# Patient Record
Sex: MALE | Race: WHITE | NOT HISPANIC OR LATINO | Employment: OTHER | URBAN - METROPOLITAN AREA
[De-identification: names, ages, dates, MRNs, and addresses within clinical notes are randomized per-mention and may not be internally consistent; named-entity substitution may affect disease eponyms.]

---

## 2023-10-28 ENCOUNTER — HOSPITAL ENCOUNTER (EMERGENCY)
Facility: MEDICAL CENTER | Age: 83
End: 2023-10-29
Attending: EMERGENCY MEDICINE
Payer: MEDICARE

## 2023-10-28 ENCOUNTER — APPOINTMENT (OUTPATIENT)
Dept: RADIOLOGY | Facility: MEDICAL CENTER | Age: 83
End: 2023-10-28
Attending: EMERGENCY MEDICINE
Payer: MEDICARE

## 2023-10-28 DIAGNOSIS — M25.561 ACUTE PAIN OF RIGHT KNEE: ICD-10-CM
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
## 2023-10-28 DIAGNOSIS — S51.012A SKIN TEAR OF ELBOW WITHOUT COMPLICATION, LEFT, INITIAL ENCOUNTER: ICD-10-CM

## 2023-10-28 DIAGNOSIS — M25.551 RIGHT HIP PAIN: ICD-10-CM

## 2023-10-28 DIAGNOSIS — N30.01 ACUTE CYSTITIS WITH HEMATURIA: ICD-10-CM

## 2023-10-28 PROCEDURE — 99285 EMERGENCY DEPT VISIT HI MDM: CPT

## 2023-10-28 RX ORDER — LIDOCAINE HYDROCHLORIDE 20 MG/ML
JELLY TOPICAL ONCE
Status: COMPLETED | OUTPATIENT
Start: 2023-10-29 | End: 2023-10-29

## 2023-10-28 ASSESSMENT — PAIN DESCRIPTION - PAIN TYPE: TYPE: ACUTE PAIN

## 2023-10-29 ENCOUNTER — HOSPITAL ENCOUNTER (OUTPATIENT)
Dept: RADIOLOGY | Facility: MEDICAL CENTER | Age: 83
End: 2023-10-29
Attending: EMERGENCY MEDICINE
Payer: COMMERCIAL

## 2023-10-29 VITALS
DIASTOLIC BLOOD PRESSURE: 74 MMHG | BODY MASS INDEX: 23.1 KG/M2 | RESPIRATION RATE: 18 BRPM | HEIGHT: 71 IN | SYSTOLIC BLOOD PRESSURE: 129 MMHG | OXYGEN SATURATION: 96 % | TEMPERATURE: 97.8 F | HEART RATE: 103 BPM | WEIGHT: 165 LBS

## 2023-10-29 LAB
ALBUMIN SERPL BCP-MCNC: 3.9 G/DL (ref 3.2–4.9)
ALBUMIN/GLOB SERPL: 1.1 G/DL
ALP SERPL-CCNC: 71 U/L (ref 30–99)
ALT SERPL-CCNC: 21 U/L (ref 2–50)
ANION GAP SERPL CALC-SCNC: 11 MMOL/L (ref 7–16)
APPEARANCE UR: CLEAR
AST SERPL-CCNC: 27 U/L (ref 12–45)
BACTERIA #/AREA URNS HPF: ABNORMAL /HPF
BASOPHILS # BLD AUTO: 0.4 % (ref 0–1.8)
BASOPHILS # BLD: 0.03 K/UL (ref 0–0.12)
BILIRUB SERPL-MCNC: 0.4 MG/DL (ref 0.1–1.5)
BILIRUB UR QL STRIP.AUTO: NEGATIVE
BUN SERPL-MCNC: 21 MG/DL (ref 8–22)
CALCIUM ALBUM COR SERPL-MCNC: 8.9 MG/DL (ref 8.5–10.5)
CALCIUM SERPL-MCNC: 8.8 MG/DL (ref 8.5–10.5)
CHLORIDE SERPL-SCNC: 101 MMOL/L (ref 96–112)
CO2 SERPL-SCNC: 21 MMOL/L (ref 20–33)
COLOR UR: YELLOW
CREAT SERPL-MCNC: 0.77 MG/DL (ref 0.5–1.4)
EOSINOPHIL # BLD AUTO: 0.03 K/UL (ref 0–0.51)
EOSINOPHIL NFR BLD: 0.4 % (ref 0–6.9)
EPI CELLS #/AREA URNS HPF: NEGATIVE /HPF
ERYTHROCYTE [DISTWIDTH] IN BLOOD BY AUTOMATED COUNT: 44.1 FL (ref 35.9–50)
GFR SERPLBLD CREATININE-BSD FMLA CKD-EPI: 87 ML/MIN/1.73 M 2
GLOBULIN SER CALC-MCNC: 3.5 G/DL (ref 1.9–3.5)
GLUCOSE SERPL-MCNC: 111 MG/DL (ref 65–99)
GLUCOSE UR STRIP.AUTO-MCNC: NEGATIVE MG/DL
HCT VFR BLD AUTO: 35.9 % (ref 42–52)
HGB BLD-MCNC: 11.8 G/DL (ref 14–18)
HYALINE CASTS #/AREA URNS LPF: ABNORMAL /LPF
IMM GRANULOCYTES # BLD AUTO: 0.12 K/UL (ref 0–0.11)
IMM GRANULOCYTES NFR BLD AUTO: 1.4 % (ref 0–0.9)
KETONES UR STRIP.AUTO-MCNC: NEGATIVE MG/DL
LEUKOCYTE ESTERASE UR QL STRIP.AUTO: ABNORMAL
LYMPHOCYTES # BLD AUTO: 1.39 K/UL (ref 1–4.8)
LYMPHOCYTES NFR BLD: 16.3 % (ref 22–41)
MCH RBC QN AUTO: 29.1 PG (ref 27–33)
MCHC RBC AUTO-ENTMCNC: 32.9 G/DL (ref 32.3–36.5)
MCV RBC AUTO: 88.6 FL (ref 81.4–97.8)
MICRO URNS: ABNORMAL
MONOCYTES # BLD AUTO: 0.51 K/UL (ref 0–0.85)
MONOCYTES NFR BLD AUTO: 6 % (ref 0–13.4)
NEUTROPHILS # BLD AUTO: 6.45 K/UL (ref 1.82–7.42)
NEUTROPHILS NFR BLD: 75.5 % (ref 44–72)
NITRITE UR QL STRIP.AUTO: POSITIVE
NRBC # BLD AUTO: 0 K/UL
NRBC BLD-RTO: 0 /100 WBC (ref 0–0.2)
PH UR STRIP.AUTO: 6.5 [PH] (ref 5–8)
PLATELET # BLD AUTO: 197 K/UL (ref 164–446)
PMV BLD AUTO: 9.3 FL (ref 9–12.9)
POTASSIUM SERPL-SCNC: 4 MMOL/L (ref 3.6–5.5)
PROT SERPL-MCNC: 7.4 G/DL (ref 6–8.2)
PROT UR QL STRIP: NEGATIVE MG/DL
RBC # BLD AUTO: 4.05 M/UL (ref 4.7–6.1)
RBC # URNS HPF: ABNORMAL /HPF
RBC UR QL AUTO: ABNORMAL
SODIUM SERPL-SCNC: 133 MMOL/L (ref 135–145)
SP GR UR STRIP.AUTO: 1.01
UROBILINOGEN UR STRIP.AUTO-MCNC: 0.2 MG/DL
WBC # BLD AUTO: 8.5 K/UL (ref 4.8–10.8)
WBC #/AREA URNS HPF: ABNORMAL /HPF

## 2023-10-29 PROCEDURE — 73564 X-RAY EXAM KNEE 4 OR MORE: CPT | Mod: RT

## 2023-10-29 PROCEDURE — A9270 NON-COVERED ITEM OR SERVICE: HCPCS | Performed by: EMERGENCY MEDICINE

## 2023-10-29 PROCEDURE — 80053 COMPREHEN METABOLIC PANEL: CPT

## 2023-10-29 PROCEDURE — 72170 X-RAY EXAM OF PELVIS: CPT

## 2023-10-29 PROCEDURE — 700102 HCHG RX REV CODE 250 W/ 637 OVERRIDE(OP): Performed by: EMERGENCY MEDICINE

## 2023-10-29 PROCEDURE — 81001 URINALYSIS AUTO W/SCOPE: CPT

## 2023-10-29 PROCEDURE — 85025 COMPLETE CBC W/AUTO DIFF WBC: CPT

## 2023-10-29 PROCEDURE — 36415 COLL VENOUS BLD VENIPUNCTURE: CPT

## 2023-10-29 PROCEDURE — 304217 HCHG IRRIGATION SYSTEM

## 2023-10-29 PROCEDURE — 73080 X-RAY EXAM OF ELBOW: CPT | Mod: LT

## 2023-10-29 PROCEDURE — 73552 X-RAY EXAM OF FEMUR 2/>: CPT | Mod: RT

## 2023-10-29 PROCEDURE — 700101 HCHG RX REV CODE 250: Performed by: EMERGENCY MEDICINE

## 2023-10-29 RX ORDER — CEPHALEXIN 500 MG/1
500 CAPSULE ORAL 2 TIMES DAILY
Qty: 10 CAPSULE | Refills: 0 | Status: ACTIVE | OUTPATIENT
Start: 2023-10-29 | End: 2023-11-03

## 2023-10-29 RX ORDER — CEPHALEXIN 500 MG/1
500 CAPSULE ORAL ONCE
Status: COMPLETED | OUTPATIENT
Start: 2023-10-29 | End: 2023-10-29

## 2023-10-29 RX ADMIN — CEPHALEXIN 500 MG: 500 CAPSULE ORAL at 02:53

## 2023-10-29 RX ADMIN — LIDOCAINE HYDROCHLORIDE 120 DOSE: 20 JELLY TOPICAL at 00:39

## 2023-10-29 NOTE — ED PROVIDER NOTES
ED Provider Note    CHIEF COMPLAINT  Chief Complaint   Patient presents with    GLF     BIB EMS after reported GLF. Patient a resident at [facility]. Staff found patinet down at 9pm and suspected GLF. Patient reports no trauma/pain to head/neck. Patients only complaint is right knee pain and a skin tear on left forearm     Knee Pain     Patient report right knee pain after GLF.     EXTERNAL RECORDS REVIEWED  none    HPI/ROS  LIMITATION TO HISTORY   Select: dementia  OUTSIDE HISTORIAN(S):  Significant other at bedside who provides majority of history    Alexa Mcclelland is a 83 y.o. male who presents to the emergency room coming from an assisted living center.  At approximately 9 PM the patient had fallen from his wheelchair where he is chronically bound.  He does not walk on his own, his wife lives at their home and received a call saying that he had fallen but this had not been witnessed.  He reports he believes he fell mostly on his right hip and knee and had also extended his left arm out to observe the fall.  He has a small abrasion/laceration on his left elbow that has been dressed and continues to complain of right-sided hip and leg pain.  There is no obvious shortening, he is not on blood thinners, he had no head strike or loss of consciousness per him.  He is otherwise pleasant though has dementia and is somewhat inconsistent historian.    PAST MEDICAL HISTORY   Dementia, hypertension, dyslipidemia    SURGICAL HISTORY  patient denies any surgical history    FAMILY HISTORY  No family history on file.    SOCIAL HISTORY  Social History     Tobacco Use    Smoking status: Not on file    Smokeless tobacco: Not on file   Substance and Sexual Activity    Alcohol use: Not on file    Drug use: Not on file    Sexual activity: Not on file       CURRENT MEDICATIONS  Home Medications    **Home medications have not yet been reviewed for this encounter**         ALLERGIES  Allergies   Allergen Reactions     "Hydrocodone Unspecified     Allergy to Vicodin    Neosporin [Bacitracin-Polymyxin B] Unspecified     Patient unaware of reaction       PHYSICAL EXAM  VITAL SIGNS: BP (!) 153/68   Pulse 81   Ht 1.803 m (5' 11\")   Wt 74.8 kg (165 lb)   SpO2 96%   BMI 23.01 kg/m²    Genl: Elderly demented M sitting in gurney uncomfortably, speaking clearly, appears in very mild discomfort  Head: NC/AT   ENT: Mucous membranes moist, posterior pharynx clear, uvula midline, nares patent bilaterally   Pulmonary: Lungs are clear to auscultation bilaterally  Chest: No TTP  CV:  RRR, no murmur appreciated, pulses 2+ in both upper and lower extremities,  Abdomen: soft, NT/ND; no rebound/guarding, no masses palpated, no HSM   Pelvis: pain with patient over the right iliac crest and ASIS.  : no CVA or suprapubic tenderness   Musculoskeletal: Pain free ROM of the neck. Moving upper extremities in spontaneous and coordinated fashion.  Right hip is limited secondary to pain.  Same with me.  Tenderness along the long bones of the right femur.  No obvious angulations.  Neuro: A&Ox2 (person, place), speech fluent, gait not assessed as he is wheelchair bound. no focal deficits appreciated, No cerebellar signs. Sensation is grossly intact in the distal upper and lower extremities.  5/5 strength in   Psych: Patient has an appropriate affect and behavior  Skin: No rash or lesions.  No pallor or jaundice.  No cyanosis.  Warm and dry.     DIAGNOSTIC STUDIES / PROCEDURES    LABS  Labs Reviewed   CBC WITH DIFFERENTIAL - Abnormal; Notable for the following components:       Result Value    RBC 4.05 (*)     Hemoglobin 11.8 (*)     Hematocrit 35.9 (*)     Neutrophils-Polys 75.50 (*)     Lymphocytes 16.30 (*)     Immature Granulocytes 1.40 (*)     Immature Granulocytes (abs) 0.12 (*)     All other components within normal limits   COMP METABOLIC PANEL - Abnormal; Notable for the following components:    Sodium 133 (*)     Glucose 111 (*)     All other " components within normal limits   URINALYSIS - Abnormal; Notable for the following components:    Nitrite Positive (*)     Leukocyte Esterase Small (*)     Occult Blood Trace (*)     All other components within normal limits    Narrative:     Release to patient->Immediate   URINE MICROSCOPIC (W/UA) - Abnormal; Notable for the following components:    WBC 10-20 (*)     RBC 2-5 (*)     Bacteria Many (*)     All other components within normal limits    Narrative:     Release to patient->Immediate   ESTIMATED GFR     RADIOLOGY  I have independently interpreted the diagnostic imaging associated with this visit and am waiting the final reading from the radiologist.   My preliminary interpretation is as follows: No evidence of acute fracture of the elbow, pelvis/hip, femur or knee.  Radiologist interpretation:   DX-KNEE COMPLETE 4+ RIGHT   Final Result      No fracture or dislocation of RIGHT knee.      DX-FEMUR-2+ RIGHT   Final Result      No RIGHT femur fracture.      DX-PELVIS-1 OR 2 VIEWS   Final Result      1.  No pelvic fracture.   2.  Peripherally sclerotic lesion in the LEFT proximal femur most likely enchondroma although exact etiology is uncertain.      DX-ELBOW-COMPLETE 3+ LEFT   Final Result      No fracture or dislocation of LEFT elbow.        COURSE & MEDICAL DECISION MAKING    ED Observation Status? No; Patient does not meet criteria for ED Observation.     INITIAL ASSESSMENT, COURSE AND PLAN  Hip contusion, fracture, long bone injury, skin tear    Care Narrative: Presents emergency room for symptoms as described above.  The patient had a fall from his wheelchair for which he is chronically dependent on.  He is alert, pleasantly demented and oriented to self and location and his family members.  He does not have any head or neck discomfort, no recent prodromal symptoms and has a skin tear on the left elbow with some right hip and leg discomfort.  I was concerned about possible traumatic injuries or fractures  in addition to the possibility of slowly developing a urinary tract infection.  He does not have septic vital signs, he does not have hemodynamic instability.    Lab work showed leukocytosis, stable chronic anemia, no gross metabolic derangements.  No LFT elevations, no PETER.  Urinalysis shows bacteria with 10-20 white cells and presence of nitrates and excite esterase.  He does not have flank tenderness to suggest pyelonephritis and I do not see substantial amount of hematuria to suggest concurrent stone pathology.  He is treated with antibiotics, updated regarding his basic wound care of his skin tear of the left upper extremity and he discharged home in stable condition.    Strict return precautions are discussed.    DISPOSITION AND DISCUSSIONS  I have discussed management of the patient with the following physicians and IAIN's:  none    Discussion of management with other [facility] or appropriate source(s): None     Escalation of care considered, and ultimately not performed:acute inpatient care management, however at this time, the patient is most appropriate for outpatient management    Decision tools and prescription drugs considered including, but not limited to: Antibiotics Keflex for UTI .    FINAL DIAGNOSIS  1. Acute pain of right knee    2. Fall, initial encounter    3. Right hip pain    4. Acute cystitis with hematuria    5. Skin tear of elbow without complication, left, initial encounter      Electronically signed by: Syd Griffin M.D., 10/28/2023 11:43 PM

## 2023-10-29 NOTE — ED NOTES
Pt discharged, all appropriate hospital equipment removed (IV, monitor, pulse ox, etc.). Pt left unit via gurney with EMS to [place] [facility]. Personal belongings with pt when leaving unit. Pt discharge instructions given to wife prior to leaving unit including where to  prescriptions and when to follow-up; she verbalizes understanding. Pt and family informed to return to ED if symptoms worsen/return or altered status develop. Copy of discharge instructions signed and turned into DC basket and copy sent with pt.

## 2023-10-29 NOTE — DISCHARGE PLANNING
Medical Social Work     FREDERICK received a call from the RN requesting FREDERICK assistance with [facility] transport back to [facility]. FREDERICK called and spoke with staff at the facility they will be waiting for the pt. FREDERICK faxed a PCS form to [facility] and set up transport with  for 0400. RN aware of transport time.

## 2023-10-29 NOTE — ED NOTES
Rounded on patient. He remains resting comfortably in bed. He has access to urinal and call light. No immediate needs at this time

## 2023-10-29 NOTE — ED NOTES
Patient cleaned after bowl incontinence noticed. Noticed very red skin around groin area. Patient was cleaned with no rinse foam  and warm towels.

## 2023-10-29 NOTE — ED TRIAGE NOTES
Chief Complaint   Patient presents with    GLF     BIB EMS after reported GLF. Patient a resident at [facility]. Staff found patinet down at 9pm and suspected GLF. Patient reports no trauma/pain to head/neck. Patients only complaint is right knee pain and a skin tear on left forearm     Knee Pain     Patient report right knee pain after GLF.     Patient gowned and placed on monitors. Family at bedside. Patient does not recall the fall. Expresses pain in right knee. Wound to left forearm. Patient has history of dementia and HTN. Patient usually uses wheelchair for ambulation. Patient stable on RA

## 2023-10-29 NOTE — ED NOTES
Mariajose at [facility] called and given report. Informed her that patient would be DC soon and would be arriving back to facility via [facility]

## 2023-12-22 ENCOUNTER — HOSPITAL ENCOUNTER (INPATIENT)
Facility: MEDICAL CENTER | Age: 83
LOS: 4 days | DRG: 378 | End: 2023-12-26
Attending: STUDENT IN AN ORGANIZED HEALTH CARE EDUCATION/TRAINING PROGRAM | Admitting: HOSPITALIST
Payer: COMMERCIAL

## 2023-12-22 DIAGNOSIS — K92.1 GASTROINTESTINAL HEMORRHAGE WITH MELENA: ICD-10-CM

## 2023-12-22 DIAGNOSIS — K92.1 MELENA: ICD-10-CM

## 2023-12-22 DIAGNOSIS — Z79.01 ON ANTICOAGULANT THERAPY: ICD-10-CM

## 2023-12-22 DIAGNOSIS — F03.90 DEMENTIA, UNSPECIFIED DEMENTIA SEVERITY, UNSPECIFIED DEMENTIA TYPE, UNSPECIFIED WHETHER BEHAVIORAL, PSYCHOTIC, OR MOOD DISTURBANCE OR ANXIETY (HCC): ICD-10-CM

## 2023-12-22 DIAGNOSIS — D64.9 ANEMIA, UNSPECIFIED TYPE: ICD-10-CM

## 2023-12-22 PROBLEM — D62 ACUTE BLOOD LOSS ANEMIA: Status: ACTIVE | Noted: 2023-12-22

## 2023-12-22 PROBLEM — I48.91 AF (ATRIAL FIBRILLATION) (HCC): Status: ACTIVE | Noted: 2023-12-22

## 2023-12-22 PROBLEM — K92.2 GI BLEED: Status: ACTIVE | Noted: 2023-12-22

## 2023-12-22 PROBLEM — I25.10 CORONARY ARTERY DISEASE: Status: ACTIVE | Noted: 2023-12-22

## 2023-12-22 PROBLEM — I10 HYPERTENSION: Status: ACTIVE | Noted: 2023-12-22

## 2023-12-22 LAB
ABO + RH BLD: NORMAL
ABO GROUP BLD: NORMAL
ALBUMIN SERPL BCP-MCNC: 3.9 G/DL (ref 3.2–4.9)
ALBUMIN/GLOB SERPL: 1.4 G/DL
ALP SERPL-CCNC: 77 U/L (ref 30–99)
ALT SERPL-CCNC: 23 U/L (ref 2–50)
ANION GAP SERPL CALC-SCNC: 10 MMOL/L (ref 7–16)
APTT PPP: 35.5 SEC (ref 24.7–36)
AST SERPL-CCNC: 29 U/L (ref 12–45)
BASOPHILS # BLD AUTO: 0.4 % (ref 0–1.8)
BASOPHILS # BLD: 0.03 K/UL (ref 0–0.12)
BILIRUB SERPL-MCNC: 0.3 MG/DL (ref 0.1–1.5)
BLD GP AB SCN SERPL QL: NORMAL
BUN SERPL-MCNC: 20 MG/DL (ref 8–22)
CALCIUM ALBUM COR SERPL-MCNC: 8.7 MG/DL (ref 8.5–10.5)
CALCIUM SERPL-MCNC: 8.6 MG/DL (ref 8.5–10.5)
CHLORIDE SERPL-SCNC: 103 MMOL/L (ref 96–112)
CO2 SERPL-SCNC: 23 MMOL/L (ref 20–33)
CREAT SERPL-MCNC: 0.8 MG/DL (ref 0.5–1.4)
EOSINOPHIL # BLD AUTO: 0.05 K/UL (ref 0–0.51)
EOSINOPHIL NFR BLD: 0.7 % (ref 0–6.9)
ERYTHROCYTE [DISTWIDTH] IN BLOOD BY AUTOMATED COUNT: 44.1 FL (ref 35.9–50)
GFR SERPLBLD CREATININE-BSD FMLA CKD-EPI: 86 ML/MIN/1.73 M 2
GLOBULIN SER CALC-MCNC: 2.8 G/DL (ref 1.9–3.5)
GLUCOSE SERPL-MCNC: 97 MG/DL (ref 65–99)
HCT VFR BLD AUTO: 25.5 % (ref 42–52)
HGB BLD-MCNC: 8.4 G/DL (ref 14–18)
IMM GRANULOCYTES # BLD AUTO: 0.06 K/UL (ref 0–0.11)
IMM GRANULOCYTES NFR BLD AUTO: 0.8 % (ref 0–0.9)
INR PPP: 1.17 (ref 0.87–1.13)
LIPASE SERPL-CCNC: 66 U/L (ref 11–82)
LYMPHOCYTES # BLD AUTO: 2.06 K/UL (ref 1–4.8)
LYMPHOCYTES NFR BLD: 28 % (ref 22–41)
MCH RBC QN AUTO: 28.6 PG (ref 27–33)
MCHC RBC AUTO-ENTMCNC: 32.9 G/DL (ref 32.3–36.5)
MCV RBC AUTO: 86.7 FL (ref 81.4–97.8)
MONOCYTES # BLD AUTO: 0.5 K/UL (ref 0–0.85)
MONOCYTES NFR BLD AUTO: 6.8 % (ref 0–13.4)
NEUTROPHILS # BLD AUTO: 4.67 K/UL (ref 1.82–7.42)
NEUTROPHILS NFR BLD: 63.3 % (ref 44–72)
NRBC # BLD AUTO: 0 K/UL
NRBC BLD-RTO: 0 /100 WBC (ref 0–0.2)
PLATELET # BLD AUTO: 218 K/UL (ref 164–446)
PMV BLD AUTO: 9.4 FL (ref 9–12.9)
POTASSIUM SERPL-SCNC: 4.2 MMOL/L (ref 3.6–5.5)
PROT SERPL-MCNC: 6.7 G/DL (ref 6–8.2)
PROTHROMBIN TIME: 15 SEC (ref 12–14.6)
RBC # BLD AUTO: 2.94 M/UL (ref 4.7–6.1)
RH BLD: NORMAL
SODIUM SERPL-SCNC: 136 MMOL/L (ref 135–145)
WBC # BLD AUTO: 7.4 K/UL (ref 4.8–10.8)

## 2023-12-22 PROCEDURE — 99285 EMERGENCY DEPT VISIT HI MDM: CPT

## 2023-12-22 PROCEDURE — 86901 BLOOD TYPING SEROLOGIC RH(D): CPT

## 2023-12-22 PROCEDURE — 85730 THROMBOPLASTIN TIME PARTIAL: CPT

## 2023-12-22 PROCEDURE — 80053 COMPREHEN METABOLIC PANEL: CPT

## 2023-12-22 PROCEDURE — 86900 BLOOD TYPING SEROLOGIC ABO: CPT

## 2023-12-22 PROCEDURE — 700102 HCHG RX REV CODE 250 W/ 637 OVERRIDE(OP): Performed by: HOSPITALIST

## 2023-12-22 PROCEDURE — 700111 HCHG RX REV CODE 636 W/ 250 OVERRIDE (IP): Performed by: HOSPITALIST

## 2023-12-22 PROCEDURE — 85025 COMPLETE CBC W/AUTO DIFF WBC: CPT

## 2023-12-22 PROCEDURE — 83690 ASSAY OF LIPASE: CPT

## 2023-12-22 PROCEDURE — 700105 HCHG RX REV CODE 258: Performed by: STUDENT IN AN ORGANIZED HEALTH CARE EDUCATION/TRAINING PROGRAM

## 2023-12-22 PROCEDURE — 700111 HCHG RX REV CODE 636 W/ 250 OVERRIDE (IP): Performed by: STUDENT IN AN ORGANIZED HEALTH CARE EDUCATION/TRAINING PROGRAM

## 2023-12-22 PROCEDURE — 86850 RBC ANTIBODY SCREEN: CPT

## 2023-12-22 PROCEDURE — 85610 PROTHROMBIN TIME: CPT

## 2023-12-22 PROCEDURE — 96374 THER/PROPH/DIAG INJ IV PUSH: CPT

## 2023-12-22 PROCEDURE — 99222 1ST HOSP IP/OBS MODERATE 55: CPT | Performed by: INTERNAL MEDICINE

## 2023-12-22 PROCEDURE — A9270 NON-COVERED ITEM OR SERVICE: HCPCS | Performed by: HOSPITALIST

## 2023-12-22 PROCEDURE — 36415 COLL VENOUS BLD VENIPUNCTURE: CPT

## 2023-12-22 PROCEDURE — C9113 INJ PANTOPRAZOLE SODIUM, VIA: HCPCS | Performed by: HOSPITALIST

## 2023-12-22 PROCEDURE — 99223 1ST HOSP IP/OBS HIGH 75: CPT | Mod: AI | Performed by: HOSPITALIST

## 2023-12-22 PROCEDURE — C9113 INJ PANTOPRAZOLE SODIUM, VIA: HCPCS | Performed by: STUDENT IN AN ORGANIZED HEALTH CARE EDUCATION/TRAINING PROGRAM

## 2023-12-22 PROCEDURE — 770006 HCHG ROOM/CARE - MED/SURG/GYN SEMI*

## 2023-12-22 RX ORDER — ONDANSETRON 2 MG/ML
4 INJECTION INTRAMUSCULAR; INTRAVENOUS EVERY 4 HOURS PRN
Status: DISCONTINUED | OUTPATIENT
Start: 2023-12-22 | End: 2023-12-26 | Stop reason: HOSPADM

## 2023-12-22 RX ORDER — TERAZOSIN 1 MG/1
4 CAPSULE ORAL NIGHTLY
Status: DISCONTINUED | OUTPATIENT
Start: 2023-12-22 | End: 2023-12-26 | Stop reason: HOSPADM

## 2023-12-22 RX ORDER — METOPROLOL SUCCINATE 25 MG/1
25 TABLET, EXTENDED RELEASE ORAL DAILY
Status: DISCONTINUED | OUTPATIENT
Start: 2023-12-23 | End: 2023-12-26 | Stop reason: HOSPADM

## 2023-12-22 RX ORDER — AMLODIPINE BESYLATE 5 MG/1
5 TABLET ORAL 2 TIMES DAILY
COMMUNITY

## 2023-12-22 RX ORDER — TERAZOSIN 2 MG/1
4 CAPSULE ORAL NIGHTLY
COMMUNITY

## 2023-12-22 RX ORDER — QUETIAPINE FUMARATE 25 MG/1
25 TABLET, FILM COATED ORAL 2 TIMES DAILY
Status: DISCONTINUED | OUTPATIENT
Start: 2023-12-22 | End: 2023-12-26 | Stop reason: HOSPADM

## 2023-12-22 RX ORDER — GALANTAMINE HYDROBROMIDE 16 MG/1
16 CAPSULE, EXTENDED RELEASE ORAL
COMMUNITY

## 2023-12-22 RX ORDER — HYDROXYZINE HYDROCHLORIDE 10 MG/1
10 TABLET, FILM COATED ORAL EVERY 6 HOURS
COMMUNITY

## 2023-12-22 RX ORDER — ONDANSETRON 4 MG/1
4 TABLET, ORALLY DISINTEGRATING ORAL EVERY 4 HOURS PRN
Status: DISCONTINUED | OUTPATIENT
Start: 2023-12-22 | End: 2023-12-26 | Stop reason: HOSPADM

## 2023-12-22 RX ORDER — CLOPIDOGREL BISULFATE 75 MG/1
75 TABLET ORAL DAILY
Status: ON HOLD | COMMUNITY
End: 2023-12-26

## 2023-12-22 RX ORDER — PANTOPRAZOLE SODIUM 40 MG/10ML
40 INJECTION, POWDER, LYOPHILIZED, FOR SOLUTION INTRAVENOUS 2 TIMES DAILY
Status: DISCONTINUED | OUTPATIENT
Start: 2023-12-22 | End: 2023-12-26 | Stop reason: HOSPADM

## 2023-12-22 RX ORDER — ACETAMINOPHEN 325 MG/1
650 TABLET ORAL EVERY 6 HOURS PRN
Status: DISCONTINUED | OUTPATIENT
Start: 2023-12-22 | End: 2023-12-26 | Stop reason: HOSPADM

## 2023-12-22 RX ORDER — LEVETIRACETAM 500 MG/1
1000 TABLET ORAL 2 TIMES DAILY
Status: DISCONTINUED | OUTPATIENT
Start: 2023-12-22 | End: 2023-12-26 | Stop reason: HOSPADM

## 2023-12-22 RX ORDER — MYCOPHENOLATE MOFETIL 500 MG/1
500 TABLET ORAL 2 TIMES DAILY
COMMUNITY

## 2023-12-22 RX ORDER — QUETIAPINE FUMARATE 25 MG/1
25 TABLET, FILM COATED ORAL
COMMUNITY

## 2023-12-22 RX ORDER — DEXTRAN 70, GLYCERIN, HYPROMELLOSE 1; 2; 3 MG/ML; MG/ML; MG/ML
1 SOLUTION/ DROPS OPHTHALMIC 4 TIMES DAILY PRN
COMMUNITY

## 2023-12-22 RX ORDER — ROSUVASTATIN CALCIUM 40 MG/1
40 TABLET, COATED ORAL DAILY
COMMUNITY

## 2023-12-22 RX ORDER — METOPROLOL SUCCINATE 25 MG/1
25 TABLET, EXTENDED RELEASE ORAL DAILY
COMMUNITY

## 2023-12-22 RX ORDER — QUETIAPINE FUMARATE 25 MG/1
25 TABLET, FILM COATED ORAL 2 TIMES DAILY
COMMUNITY

## 2023-12-22 RX ORDER — SERTRALINE HYDROCHLORIDE 100 MG/1
100 TABLET, FILM COATED ORAL DAILY
COMMUNITY

## 2023-12-22 RX ORDER — AMLODIPINE BESYLATE 5 MG/1
5 TABLET ORAL 2 TIMES DAILY
Status: DISCONTINUED | OUTPATIENT
Start: 2023-12-22 | End: 2023-12-26 | Stop reason: HOSPADM

## 2023-12-22 RX ORDER — SERTRALINE HYDROCHLORIDE 100 MG/1
100 TABLET, FILM COATED ORAL DAILY
Status: DISCONTINUED | OUTPATIENT
Start: 2023-12-23 | End: 2023-12-26 | Stop reason: HOSPADM

## 2023-12-22 RX ORDER — LEVETIRACETAM 1000 MG/1
1000 TABLET ORAL 2 TIMES DAILY
COMMUNITY

## 2023-12-22 RX ADMIN — AMLODIPINE BESYLATE 5 MG: 5 TABLET ORAL at 22:28

## 2023-12-22 RX ADMIN — TERAZOSIN HYDROCHLORIDE 4 MG: 1 CAPSULE ORAL at 22:28

## 2023-12-22 RX ADMIN — PANTOPRAZOLE SODIUM 40 MG: 40 INJECTION, POWDER, FOR SOLUTION INTRAVENOUS at 22:30

## 2023-12-22 RX ADMIN — LEVETIRACETAM 1000 MG: 500 TABLET, FILM COATED ORAL at 20:15

## 2023-12-22 RX ADMIN — PANTOPRAZOLE SODIUM 80 MG: 40 INJECTION, POWDER, FOR SOLUTION INTRAVENOUS at 18:02

## 2023-12-22 RX ADMIN — QUETIAPINE FUMARATE 25 MG: 25 TABLET ORAL at 22:28

## 2023-12-22 ASSESSMENT — ENCOUNTER SYMPTOMS
HEARTBURN: 0
WEAKNESS: 0
TINGLING: 0
SORE THROAT: 0
BLOOD IN STOOL: 1
DEPRESSION: 0
HALLUCINATIONS: 0
NAUSEA: 0
PALPITATIONS: 0
SHORTNESS OF BREATH: 0
HEADACHES: 0
DIZZINESS: 0
DIARRHEA: 0
POLYDIPSIA: 0
STRIDOR: 0
TREMORS: 0
BLURRED VISION: 0
CLAUDICATION: 0
ABDOMINAL PAIN: 0
WHEEZING: 0
MYALGIAS: 0
COUGH: 0
ORTHOPNEA: 0
SPUTUM PRODUCTION: 0
FLANK PAIN: 0
FALLS: 0
SINUS PAIN: 0
BACK PAIN: 0
CONSTIPATION: 0
PND: 0
NECK PAIN: 0
BRUISES/BLEEDS EASILY: 0
FEVER: 0
PHOTOPHOBIA: 0
CHILLS: 0
DIAPHORESIS: 0
HEMOPTYSIS: 0
EYE PAIN: 0
VOMITING: 0
DOUBLE VISION: 0

## 2023-12-22 ASSESSMENT — LIFESTYLE VARIABLES
SUBSTANCE_ABUSE: 0
DO YOU DRINK ALCOHOL: NO

## 2023-12-22 ASSESSMENT — PAIN DESCRIPTION - PAIN TYPE: TYPE: ACUTE PAIN

## 2023-12-22 ASSESSMENT — FIBROSIS 4 INDEX: FIB4 SCORE: 2.54

## 2023-12-23 ENCOUNTER — ANESTHESIA (OUTPATIENT)
Dept: SURGERY | Facility: MEDICAL CENTER | Age: 83
DRG: 378 | End: 2023-12-23
Payer: COMMERCIAL

## 2023-12-23 ENCOUNTER — ANESTHESIA EVENT (OUTPATIENT)
Dept: SURGERY | Facility: MEDICAL CENTER | Age: 83
DRG: 378 | End: 2023-12-23
Payer: COMMERCIAL

## 2023-12-23 PROBLEM — Z71.89 ADVANCE CARE PLANNING: Status: ACTIVE | Noted: 2023-12-23

## 2023-12-23 LAB
ALBUMIN SERPL BCP-MCNC: 3.4 G/DL (ref 3.2–4.9)
ALBUMIN/GLOB SERPL: 1.3 G/DL
ALP SERPL-CCNC: 70 U/L (ref 30–99)
ALT SERPL-CCNC: 19 U/L (ref 2–50)
ANION GAP SERPL CALC-SCNC: 10 MMOL/L (ref 7–16)
AST SERPL-CCNC: 27 U/L (ref 12–45)
BASOPHILS # BLD AUTO: 0.3 % (ref 0–1.8)
BASOPHILS # BLD: 0.02 K/UL (ref 0–0.12)
BILIRUB SERPL-MCNC: 0.4 MG/DL (ref 0.1–1.5)
BUN SERPL-MCNC: 18 MG/DL (ref 8–22)
CALCIUM ALBUM COR SERPL-MCNC: 8.8 MG/DL (ref 8.5–10.5)
CALCIUM SERPL-MCNC: 8.3 MG/DL (ref 8.5–10.5)
CHLORIDE SERPL-SCNC: 104 MMOL/L (ref 96–112)
CO2 SERPL-SCNC: 22 MMOL/L (ref 20–33)
CREAT SERPL-MCNC: 0.83 MG/DL (ref 0.5–1.4)
EKG IMPRESSION: NORMAL
EOSINOPHIL # BLD AUTO: 0.06 K/UL (ref 0–0.51)
EOSINOPHIL NFR BLD: 0.9 % (ref 0–6.9)
ERYTHROCYTE [DISTWIDTH] IN BLOOD BY AUTOMATED COUNT: 45.8 FL (ref 35.9–50)
FERRITIN SERPL-MCNC: 41.1 NG/ML (ref 22–322)
GFR SERPLBLD CREATININE-BSD FMLA CKD-EPI: 85 ML/MIN/1.73 M 2
GLOBULIN SER CALC-MCNC: 2.6 G/DL (ref 1.9–3.5)
GLUCOSE SERPL-MCNC: 103 MG/DL (ref 65–99)
HCT VFR BLD AUTO: 23.5 % (ref 42–52)
HGB BLD-MCNC: 7.2 G/DL (ref 14–18)
HGB BLD-MCNC: 7.6 G/DL (ref 14–18)
HGB BLD-MCNC: 7.6 G/DL (ref 14–18)
IMM GRANULOCYTES # BLD AUTO: 0.06 K/UL (ref 0–0.11)
IMM GRANULOCYTES NFR BLD AUTO: 0.9 % (ref 0–0.9)
IRON SATN MFR SERPL: 12 % (ref 15–55)
IRON SERPL-MCNC: 36 UG/DL (ref 50–180)
LYMPHOCYTES # BLD AUTO: 1.77 K/UL (ref 1–4.8)
LYMPHOCYTES NFR BLD: 25.2 % (ref 22–41)
MCH RBC QN AUTO: 28.6 PG (ref 27–33)
MCHC RBC AUTO-ENTMCNC: 32.3 G/DL (ref 32.3–36.5)
MCV RBC AUTO: 88.3 FL (ref 81.4–97.8)
MONOCYTES # BLD AUTO: 0.43 K/UL (ref 0–0.85)
MONOCYTES NFR BLD AUTO: 6.1 % (ref 0–13.4)
NEUTROPHILS # BLD AUTO: 4.67 K/UL (ref 1.82–7.42)
NEUTROPHILS NFR BLD: 66.6 % (ref 44–72)
NRBC # BLD AUTO: 0 K/UL
NRBC BLD-RTO: 0 /100 WBC (ref 0–0.2)
PLATELET # BLD AUTO: 192 K/UL (ref 164–446)
PMV BLD AUTO: 9.5 FL (ref 9–12.9)
POTASSIUM SERPL-SCNC: 3.8 MMOL/L (ref 3.6–5.5)
PROT SERPL-MCNC: 6 G/DL (ref 6–8.2)
RBC # BLD AUTO: 2.66 M/UL (ref 4.7–6.1)
SODIUM SERPL-SCNC: 136 MMOL/L (ref 135–145)
TIBC SERPL-MCNC: 292 UG/DL (ref 250–450)
UIBC SERPL-MCNC: 256 UG/DL (ref 110–370)
WBC # BLD AUTO: 7 K/UL (ref 4.8–10.8)

## 2023-12-23 PROCEDURE — 82728 ASSAY OF FERRITIN: CPT

## 2023-12-23 PROCEDURE — 160202 HCHG ENDO MINUTES - 1ST 30 MINS LEVEL 3: Performed by: INTERNAL MEDICINE

## 2023-12-23 PROCEDURE — 700101 HCHG RX REV CODE 250: Performed by: ANESTHESIOLOGY

## 2023-12-23 PROCEDURE — 80053 COMPREHEN METABOLIC PANEL: CPT

## 2023-12-23 PROCEDURE — 85018 HEMOGLOBIN: CPT | Mod: 91

## 2023-12-23 PROCEDURE — 99497 ADVNCD CARE PLAN 30 MIN: CPT | Performed by: STUDENT IN AN ORGANIZED HEALTH CARE EDUCATION/TRAINING PROGRAM

## 2023-12-23 PROCEDURE — 85025 COMPLETE CBC W/AUTO DIFF WBC: CPT

## 2023-12-23 PROCEDURE — 83540 ASSAY OF IRON: CPT

## 2023-12-23 PROCEDURE — 93010 ELECTROCARDIOGRAM REPORT: CPT | Performed by: INTERNAL MEDICINE

## 2023-12-23 PROCEDURE — 700101 HCHG RX REV CODE 250: Performed by: INTERNAL MEDICINE

## 2023-12-23 PROCEDURE — 700111 HCHG RX REV CODE 636 W/ 250 OVERRIDE (IP): Performed by: ANESTHESIOLOGY

## 2023-12-23 PROCEDURE — 99233 SBSQ HOSP IP/OBS HIGH 50: CPT | Performed by: STUDENT IN AN ORGANIZED HEALTH CARE EDUCATION/TRAINING PROGRAM

## 2023-12-23 PROCEDURE — 700102 HCHG RX REV CODE 250 W/ 637 OVERRIDE(OP): Performed by: HOSPITALIST

## 2023-12-23 PROCEDURE — 700111 HCHG RX REV CODE 636 W/ 250 OVERRIDE (IP): Mod: JZ | Performed by: HOSPITALIST

## 2023-12-23 PROCEDURE — A9270 NON-COVERED ITEM OR SERVICE: HCPCS | Performed by: HOSPITALIST

## 2023-12-23 PROCEDURE — 43235 EGD DIAGNOSTIC BRUSH WASH: CPT | Performed by: INTERNAL MEDICINE

## 2023-12-23 PROCEDURE — 160048 HCHG OR STATISTICAL LEVEL 1-5: Performed by: INTERNAL MEDICINE

## 2023-12-23 PROCEDURE — 93005 ELECTROCARDIOGRAM TRACING: CPT | Performed by: INTERNAL MEDICINE

## 2023-12-23 PROCEDURE — 36415 COLL VENOUS BLD VENIPUNCTURE: CPT

## 2023-12-23 PROCEDURE — 770006 HCHG ROOM/CARE - MED/SURG/GYN SEMI*

## 2023-12-23 PROCEDURE — 160009 HCHG ANES TIME/MIN: Performed by: INTERNAL MEDICINE

## 2023-12-23 PROCEDURE — 83550 IRON BINDING TEST: CPT

## 2023-12-23 PROCEDURE — 0DJ08ZZ INSPECTION OF UPPER INTESTINAL TRACT, VIA NATURAL OR ARTIFICIAL OPENING ENDOSCOPIC: ICD-10-PCS | Performed by: INTERNAL MEDICINE

## 2023-12-23 PROCEDURE — C9113 INJ PANTOPRAZOLE SODIUM, VIA: HCPCS | Performed by: HOSPITALIST

## 2023-12-23 PROCEDURE — 700105 HCHG RX REV CODE 258: Performed by: ANESTHESIOLOGY

## 2023-12-23 PROCEDURE — 160002 HCHG RECOVERY MINUTES (STAT): Performed by: INTERNAL MEDICINE

## 2023-12-23 PROCEDURE — 160035 HCHG PACU - 1ST 60 MINS PHASE I: Performed by: INTERNAL MEDICINE

## 2023-12-23 RX ORDER — HALOPERIDOL 5 MG/ML
1 INJECTION INTRAMUSCULAR
Status: DISCONTINUED | OUTPATIENT
Start: 2023-12-23 | End: 2023-12-23 | Stop reason: HOSPADM

## 2023-12-23 RX ORDER — DIPHENHYDRAMINE HYDROCHLORIDE 50 MG/ML
12.5 INJECTION INTRAMUSCULAR; INTRAVENOUS
Status: DISCONTINUED | OUTPATIENT
Start: 2023-12-23 | End: 2023-12-23 | Stop reason: HOSPADM

## 2023-12-23 RX ORDER — SODIUM CHLORIDE, SODIUM LACTATE, POTASSIUM CHLORIDE, CALCIUM CHLORIDE 600; 310; 30; 20 MG/100ML; MG/100ML; MG/100ML; MG/100ML
INJECTION, SOLUTION INTRAVENOUS
Status: DISCONTINUED | OUTPATIENT
Start: 2023-12-23 | End: 2023-12-23 | Stop reason: SURG

## 2023-12-23 RX ORDER — SODIUM CHLORIDE, SODIUM LACTATE, POTASSIUM CHLORIDE, CALCIUM CHLORIDE 600; 310; 30; 20 MG/100ML; MG/100ML; MG/100ML; MG/100ML
INJECTION, SOLUTION INTRAVENOUS CONTINUOUS
Status: DISCONTINUED | OUTPATIENT
Start: 2023-12-23 | End: 2023-12-23 | Stop reason: HOSPADM

## 2023-12-23 RX ORDER — ONDANSETRON 2 MG/ML
4 INJECTION INTRAMUSCULAR; INTRAVENOUS
Status: DISCONTINUED | OUTPATIENT
Start: 2023-12-23 | End: 2023-12-23 | Stop reason: HOSPADM

## 2023-12-23 RX ORDER — LIDOCAINE HYDROCHLORIDE 20 MG/ML
INJECTION, SOLUTION EPIDURAL; INFILTRATION; INTRACAUDAL; PERINEURAL PRN
Status: DISCONTINUED | OUTPATIENT
Start: 2023-12-23 | End: 2023-12-23 | Stop reason: SURG

## 2023-12-23 RX ORDER — MIDAZOLAM HYDROCHLORIDE 1 MG/ML
1 INJECTION INTRAMUSCULAR; INTRAVENOUS
Status: DISCONTINUED | OUTPATIENT
Start: 2023-12-23 | End: 2023-12-23 | Stop reason: HOSPADM

## 2023-12-23 RX ORDER — MEPERIDINE HYDROCHLORIDE 25 MG/ML
12.5 INJECTION INTRAMUSCULAR; INTRAVENOUS; SUBCUTANEOUS
Status: DISCONTINUED | OUTPATIENT
Start: 2023-12-23 | End: 2023-12-23 | Stop reason: HOSPADM

## 2023-12-23 RX ADMIN — PANTOPRAZOLE SODIUM 40 MG: 40 INJECTION, POWDER, FOR SOLUTION INTRAVENOUS at 17:42

## 2023-12-23 RX ADMIN — LEVETIRACETAM 1000 MG: 500 TABLET, FILM COATED ORAL at 17:41

## 2023-12-23 RX ADMIN — PROPOFOL 70 MG: 10 INJECTION, EMULSION INTRAVENOUS at 11:22

## 2023-12-23 RX ADMIN — QUETIAPINE FUMARATE 25 MG: 25 TABLET ORAL at 17:42

## 2023-12-23 RX ADMIN — AMLODIPINE BESYLATE 5 MG: 5 TABLET ORAL at 06:30

## 2023-12-23 RX ADMIN — SERTRALINE 100 MG: 100 TABLET, FILM COATED ORAL at 06:30

## 2023-12-23 RX ADMIN — PANTOPRAZOLE SODIUM 40 MG: 40 INJECTION, POWDER, FOR SOLUTION INTRAVENOUS at 06:30

## 2023-12-23 RX ADMIN — LEVETIRACETAM 1000 MG: 500 TABLET, FILM COATED ORAL at 06:30

## 2023-12-23 RX ADMIN — LIDOCAINE HYDROCHLORIDE 30 MG: 20 INJECTION, SOLUTION EPIDURAL; INFILTRATION; INTRACAUDAL at 11:22

## 2023-12-23 RX ADMIN — SODIUM CHLORIDE, POTASSIUM CHLORIDE, SODIUM LACTATE AND CALCIUM CHLORIDE: 600; 310; 30; 20 INJECTION, SOLUTION INTRAVENOUS at 11:18

## 2023-12-23 RX ADMIN — POLYETHYLENE GLYCOL-3350 AND ELECTROLYTES 4 L: 236; 6.74; 5.86; 2.97; 22.74 POWDER, FOR SOLUTION ORAL at 19:37

## 2023-12-23 RX ADMIN — QUETIAPINE FUMARATE 25 MG: 25 TABLET ORAL at 06:30

## 2023-12-23 RX ADMIN — METOPROLOL SUCCINATE 25 MG: 25 TABLET, EXTENDED RELEASE ORAL at 06:30

## 2023-12-23 RX ADMIN — ONDANSETRON 4 MG: 2 INJECTION INTRAMUSCULAR; INTRAVENOUS at 01:52

## 2023-12-23 ASSESSMENT — PAIN DESCRIPTION - PAIN TYPE
TYPE: ACUTE PAIN
TYPE: SURGICAL PAIN
TYPE: ACUTE PAIN

## 2023-12-23 ASSESSMENT — PAIN SCALES - GENERAL: PAIN_LEVEL: 0

## 2023-12-23 NOTE — ANESTHESIA PREPROCEDURE EVALUATION
Case: [id number] Date/Time: 12/23/23 1117    Procedure: GASTROSCOPY (Esophagus)    Anesthesia type: MAC    Pre-op diagnosis: gi bleed    Location: [facility] OR 06 / SURGERY [facility]    Surgeons: Vish Anguiano M.D.            Relevant Problems   CARDIAC   (positive) AF (atrial fibrillation) (HCC)   (positive) Coronary artery disease   (positive) Hypertension       Physical Exam    Airway   Mallampati: II  TM distance: >3 FB  Neck ROM: full       Cardiovascular - normal exam  Rhythm: regular  Rate: normal  (-) murmur     Dental - normal exam           Pulmonary - normal exam  Breath sounds clear to auscultation     Abdominal    Neurological - normal exam               Anesthesia Plan    ASA 3 (chart)       Plan - general       Airway plan will be natural airway          Induction: intravenous    Postoperative Plan: Postoperative administration of opioids is intended.    Pertinent diagnostic labs and testing reviewed    Informed Consent:    Anesthetic plan and risks discussed with patient.    Use of blood products discussed with: patient whom consented to blood products.

## 2023-12-23 NOTE — ASSESSMENT & PLAN NOTE
Rate controlled  Hold Eliquis  Discussed with GI, will resume Eliquis on 12/25 12/25: Eliquis resumed

## 2023-12-23 NOTE — H&P (VIEW-ONLY)
Date of Consultation:  12/22/2023    Patient: : Alexa Mcclelland  MRN: [id number]    Referring Physician:  Dr. Jer Fung     GI:Tl Marie M.D.     Reason for Consultation: GI bleeding     History of Present Illness: The patient is a 83 years old gentleman with medical history of afib, coronary artery disease ,n-STEMI at the [facility] about a year ago, active taking Plavix and Eliquis, last dose likely December 22 morning or December 21 afternoon, no NSAID use, presented to inpatient GI service for melena for 3 days.    Patient started to have some discomfort in the abdomen couple days ago since the starting 3 days ago patient had a fatigue, but no nausea or vomiting, seen by patient's wife.  Minimal abdominal discomfort.    GI team saw the patient at bedside, the patient is about to sleep.  No bowel movement since admission.    Soft abdomen.  No active GI symptoms side.  Never had upper GI scope before.  Colonoscopy likely around 10 years ago.      No past medical history on file.      No past surgical history on file.    No family history on file.    Social History     Socioeconomic History    Marital status:        Limited review of system because patient is about to sleep.     HEENT: grossly normal.  Cardiovascular: Please refer to primary team's daily assessment.   Lungs: Please refer to primary team's daily assessment.   Abdomen: Soft, No tenderness.  Skin: No erythema, No rash.  Lower limbs: normal, no pitting edema.   Neurologic: Alert & oriented x 3, Normal motor function, No focal deficits noted.  PSY: stable mood.       Physical Exam:  Vitals:    12/22/23 1643 12/22/23 1808 12/22/23 1908 12/22/23 2133   BP: (!) 141/65 133/60 (!) 140/63 131/78   Pulse: 68 67 81 92   Resp: 15   18   Temp: 36.5 °C (97.7 °F)   36.7 °C (98 °F)   TempSrc: Temporal   Temporal   SpO2: 98% 98% 97% 94%   Weight:       Height:                 Labs:  Recent Labs     12/22/23  1700   WBC 7.4   RBC 2.94*   HEMOGLOBIN  8.4*   HEMATOCRIT 25.5*   MCV 86.7   MCH 28.6   MCHC 32.9   RDW 44.1   PLATELETCT 218   MPV 9.4     Recent Labs     12/22/23  1700   SODIUM 136   POTASSIUM 4.2   CHLORIDE 103   CO2 23   GLUCOSE 97   BUN 20     Recent Labs     12/22/23  1700   APTT 35.5   INR 1.17*       Recent Labs     12/22/23  1700   ASTSGOT 29   ALTSGPT 23   TBILIRUBIN 0.3   ALKPHOSPHAT 77   GLOBULIN 2.8   INR 1.17*         Imaging:  DX-KNEE COMPLETE 4+ RIGHT  Narrative: 10/28/2023 11:47 PM    HISTORY/REASON FOR EXAM:  Pain/Deformity Following Trauma.  Fall, pain.    TECHNIQUE/EXAM DESCRIPTION AND NUMBER OF VIEWS:  3 views of the RIGHT knee.    COMPARISON: None    FINDINGS:  No focal soft tissue swelling or gross joint effusion.  No fracture or dislocation.  Vascular calcifications.  Multiple soft tissue calcifications, more extensive in the LEFT lower extremity, likely chronic.  Impression: No fracture or dislocation of RIGHT knee.  DX-FEMUR-2+ RIGHT  Narrative: 10/28/2023 11:47 PM    HISTORY/REASON FOR EXAM:  Pain/Deformity Following Trauma.  Fall, hip pain.    TECHNIQUE/EXAM DESCRIPTION AND NUMBER OF VIEWS:  2 views of the RIGHT femur.    COMPARISON: Pelvis 10/29/2023    FINDINGS:  Femur is intact.  No dislocation.  Diffuse vascular calcifications.  Soft tissue calcification in both thighs, or extensive on the LEFT, likely chronic.  Impression: No RIGHT femur fracture.  DX-PELVIS-1 OR 2 VIEWS  Narrative: 10/28/2023 11:47 PM    HISTORY/REASON FOR EXAM:  Pelvic/Hip Pain Following Trauma.  Fall.    TECHNIQUE/EXAM DESCRIPTION AND NUMBER OF VIEWS:  1 view(s) of the pelvis.    COMPARISON:  None.    FINDINGS:  Degenerative change of lower lumbar spine.  Pelvis is intact.  Sacrum is intact.  Peripherally sclerotic lesion in the LEFT proximal femur.  Diffuse vascular calcification.  Impression: 1.  No pelvic fracture.  2.  Peripherally sclerotic lesion in the LEFT proximal femur most likely enchondroma although exact etiology is  uncertain.  DX-ELBOW-COMPLETE 3+ LEFT  Narrative: 10/28/2023 11:47 PM    HISTORY/REASON FOR EXAM:  Pain/Deformity Following Trauma.  Fall, LEFT elbow pain.    TECHNIQUE/EXAM DESCRIPTION AND NUMBER OF VIEWS:  3 views of the LEFT elbow.    COMPARISON: None    FINDINGS:  No focal soft tissue swelling or displaced fat pad.  No fracture or dislocation.  No radiopaque foreign body.  Impression: No fracture or dislocation of LEFT elbow.            Impressions:  The patient is a 83 years old gentleman with medical history of afib, coronary artery disease ,n-STEMI at the [facility] about a year ago, active taking Plavix and Eliquis, last dose likely December 22 morning or December 21 afternoon, no NSAID use, presented to inpatient GI service for melena for 3 days.    Melena was noted in the last few days.  Not much upper GI symptoms or signs.  Previous peptic ulcer decades ago.  Last colonoscopy likely 10 years ago.  Hemoglobin right now 8.4 from the baseline around 11.     IV PPI twice daily dose with stat, supportive care, n.p.o. or clear liquid now, midnight n.p.o. for possible upper GI scope December 23 morning.  If brisk bleeding is noted, consider reversing blood thinner and ICU admission.      Diagnosis: upper gastrointestinal bleeding.   Procedure: Esophagogastroduodenoscopy with bleeding control including banding.         This note was generated using voice recognition software which has a small chance of producing errors of grammar and possibly content. I have made every reasonable attempt to find and correct any obvious errors, but expect that some may not be found prior to finalization of this note.

## 2023-12-23 NOTE — ED PROVIDER NOTES
ED Provider Note    CHIEF COMPLAINT  Chief Complaint   Patient presents with    Bloody Stools     BIB EMS from Assisted living facility. Per staff pt experience one bout of dark stool two days ago. Today the doctor requested pt be brought to ED for follow up evaluation. Pt currently denies any complaints.           EXTERNAL RECORDS REVIEWED  Patient was last seen here in October 2023 for knee pain following a ground-level fall.  Patient was found to have an acute cystitis with hematuria at that time and was treated with antibiotics.    HPI/ROS  LIMITATION TO HISTORY   Select: Dementia  - alert and oriented x 1  OUTSIDE HISTORIAN(S):  Significant other who does not live with him    Alexa Mcclelland is a 83 y.o. male who presents from assisted living facility for 1 episode of black stool.  Per EMS, patient had an episode 2 days ago and the assisted living facility recommended bringing patient in for evaluation.  Patient's wife states that he has not had any episodes of dark stools or blood in his stools in the past.  She states that he was up-to-date on his colonoscopies until he no longer needed them.  Patient is on Plavix and Apixaban for post ACS and has been on this for 1.5 years. The patient currently denies any abdominal pain, nausea, vomiting, constipation, or diarrhea.  Wife states that he has not lost a significant amount of weight in the past few months.  Patient is A&O x1 to person which is his baseline per wife.  Last dose of Eliquis as well as Plavix was this morning at 8 AM.    PAST MEDICAL HISTORY  Dementia, hypertension, dyslipidemia    SURGICAL HISTORY  patient denies any surgical history    FAMILY HISTORY  No family history on file.    SOCIAL HISTORY  Social History     Tobacco Use    Smoking status: Not on file    Smokeless tobacco: Not on file   Substance and Sexual Activity    Alcohol use: Not on file    Drug use: Not on file    Sexual activity: Not on file       CURRENT MEDICATIONS  Home  "Medications       Reviewed by Kristie Gannon R.N. (Registered Nurse) on 12/22/23 at 1643  Med List Status: Not Addressed     Medication Last Dose Status        Patient [place] Taking any Medications                           ALLERGIES  Allergies   Allergen Reactions    Hydrocodone Unspecified     Allergy to Vicodin    Neosporin [Bacitracin-Polymyxin B] Unspecified     Patient unaware of reaction       PHYSICAL EXAM  VITAL SIGNS: BP (!) 141/65   Pulse 68   Temp 36.5 °C (97.7 °F) (Temporal)   Resp 15   Ht 1.803 m (5' 11\")   Wt 74.8 kg (164 lb 14.5 oz)   SpO2 98%   BMI 23.00 kg/m²    Physical Exam  Constitutional:       General: He is not in acute distress.     Appearance: He is normal weight.   HENT:      Head: Normocephalic and atraumatic.      Right Ear: External ear normal.      Left Ear: External ear normal.      Mouth/Throat:      Mouth: Mucous membranes are moist.      Pharynx: Oropharynx is clear.   Eyes:      Extraocular Movements: Extraocular movements intact.   Cardiovascular:      Rate and Rhythm: Normal rate and regular rhythm.      Heart sounds: Normal heart sounds. No murmur heard.  Pulmonary:      Effort: Pulmonary effort is normal.      Breath sounds: Normal breath sounds. No stridor. No wheezing, rhonchi or rales.   Abdominal:      General: Abdomen is flat. Bowel sounds are normal. There is no distension.      Tenderness: There is no abdominal tenderness. There is no guarding or rebound.   Genitourinary:     Comments: Melena in rectal vault, guaiac positive, no gross blood  Musculoskeletal:         General: Normal range of motion.   Skin:     General: Skin is warm and dry.   Neurological:      Mental Status: He is alert. Mental status is at baseline.      Comments: A&O x1   Psychiatric:         Mood and Affect: Mood normal.         Behavior: Behavior normal.        DIAGNOSTIC STUDIES / PROCEDURES    LABS  Abnormal Labs Reviewed   CBC WITH DIFFERENTIAL - Abnormal; Notable for the following " components:       Result Value    RBC 2.94 (*)     Hemoglobin 8.4 (*)     Hematocrit 25.5 (*)     All other components within normal limits    Narrative:     Indicate which anticoagulants the patient is on:->UNKNOWN   PROTHROMBIN TIME - Abnormal; Notable for the following components:    PT 15.0 (*)     INR 1.17 (*)     All other components within normal limits    Narrative:     Indicate which anticoagulants the patient is on:->UNKNOWN   COMP METABOLIC PANEL - Abnormal; Notable for the following components:    Glucose 103 (*)     Calcium 8.3 (*)     All other components within normal limits       COURSE & MEDICAL DECISION MAKING    ED Observation Status? No; Patient does not meet criteria for ED Observation.     6:00 PM I consulted with GI, Dr. Marie, who recommends protonix bid and admit for scope tomorrow    6:05 PM patient reevaluated bedside.  Updated with plan for scope tomorrow and admission to the hospital.  Patient and his wife are agreeable to plan with no further questions.    INITIAL ASSESSMENT, COURSE AND PLAN  Care Narrative: 83-year-old male with history of plavix and eliquis use for pocst ACS for past 1.5 years presenting for 1 episode of melena few days ago.  Patient had melena present on rectal exam.  His hemoglobin was low at 8.4 which was down from 11.8 when he was here 1 month ago.  CMP and lipase were normal.  Concerning given patient's melena and drop in hemoglobin.  He is hemodynamically stable. He was loaded with protonix.  Discussed with GI who recommends IV PPI and endoscopy in the morning.  Discussed with the hospitalist, Dr. Fung, who agreed admit the patient to hospital.  Patient remains hemodynamically stable when he was admitted to hospitalist.        ADDITIONAL PROBLEM LIST  Dementia - stable, alert and oriented x 1 at baseline    DISPOSITION AND DISCUSSIONS  I have discussed management of the patient with the following physicians and IAIN's:  Dr. Marie, Gastroenterology and Dr. Fung,  Hospitalist.    Discussion of management with other hospitals or appropriate source(s): None     Escalation of care considered, and ultimately not performed:None    Barriers to care at this time, including but not limited to:  None .     Decision tools and prescription drugs considered including, but not limited to:  None .    Patient admitted to hospitalist, Dr. Fung, in guarded condition    FINAL DIAGNOSIS  1. Melena    2. Anemia, unspecified type    3. On anticoagulant therapy    4. Dementia, unspecified dementia severity, unspecified dementia type, unspecified whether behavioral, psychotic, or mood disturbance or anxiety (HCC)           Electronically signed by: Emely Chowdhury M.D., 12/22/2023 4:51 PM

## 2023-12-23 NOTE — CARE PLAN
The patient is Stable - Low risk of patient condition declining or worsening    Shift Goals  Clinical Goals: free of falls, rest  Patient Goals: rest  Family Goals: not present    Problem: Skin Integrity  Goal: Skin integrity is maintained or improved  Outcome: Progressing     Problem: Fall Risk  Goal: Patient will remain free from falls  Outcome: Progressing     Problem: Knowledge Deficit - Standard  Goal: Patient and family/care givers will demonstrate understanding of plan of care, disease process/condition, diagnostic tests and medications  Outcome: Not Progressing

## 2023-12-23 NOTE — ASSESSMENT & PLAN NOTE
I have discussed with patient's wife for advance care planning.  Patient has dementia.  Per his wife, the patient wishes to be DNR/DNI in the event that he has cardiac/pulmonary arrest    I discussed advance care planning with the patient's family for 16 minutes, including diagnosis, prognosis, plan of care, risks and benefits of any therapies that could be offered, as well as alternatives including palliation and hospice, as appropriate.

## 2023-12-23 NOTE — INTERVAL H&P NOTE
Patient seen and evaluated preoperatively.    The risk, benefits, and alternatives were discussed in detail. Risks include bowel perforation, procedure related bleeding event, infection, inability to safely complete the exam, sedation related complications. The patient, understanding the discussion, consents to proceed forward.    Plan EGD

## 2023-12-23 NOTE — ASSESSMENT & PLAN NOTE
With hematemesis and melena, likely upper GI source  Patient is started on IV Protonix  GI consulted, status post EGD on 12/23 Hall's esophagus, Patchy gastritis isolated to the antrum and prepyloric region. Localized duodenitis  Status post colonoscopy 12/24 with multiple polyps.  Cecal polyp was removed   Discussed with GI, will resume Eliquis on 12/25.  Discontinue Plavix  Monitoring H&H

## 2023-12-23 NOTE — ANESTHESIA TIME REPORT
Anesthesia Start and Stop Event Times       Date Time Event    12/23/2023 1111 Ready for Procedure     1118 Anesthesia Start     1135 Anesthesia Stop          Responsible Staff  12/23/23      Name Role Begin End    Jordy Talavera M.D. Anesth 1118 1135          Overtime Reason:  no overtime (within assigned shift)    Comments:

## 2023-12-23 NOTE — ASSESSMENT & PLAN NOTE
Monitor hemoglobin every 8 hours   Check iron profile and vitamin B12  Transfuse if Hb less than 7  Iron profile c/w ISA, on iv iron placement

## 2023-12-23 NOTE — H&P
Hospital Medicine History & Physical Note    Date of Service  12/22/2023    Primary Care Physician  VANESSA CLAYTON N.P.    Consultants  GI    Specialist Names:     Code Status  DNAR/DNI    Chief Complaint  Chief Complaint   Patient presents with    Bloody Stools     BIB EMS from Assisted living facility. Per staff pt experience one bout of dark stool two days ago. Today the doctor requested pt be brought to ED for follow up evaluation. Pt currently denies any complaints.           History of Presenting Illness  Alexa Mcclelland is a 83 y.o. male who presented 12/22/2023 with past medical history of coronary artery disease, dementia, chronic inflammatory demyelinating polyneuritis, history of A-fib, who presents to the hospital for 3 days of melena.  Patient lives in assisted living facility.  He denies any lightheadedness, nausea, vomiting, abdominal pain, fevers.  Patient did have a colonoscopy more than 10 years ago.  He denies any significant weight loss.  He denies any NSAID use.  Patient does take Plavix and Eliquis as an outpatient.  1 year ago he had a NSTEMI at the [facility].  Cardiac stents were not placed at the time.        I discussed the plan of care with patient.    Review of Systems  Review of Systems   Constitutional:  Negative for chills, diaphoresis, fever and malaise/fatigue.   HENT:  Negative for congestion, ear discharge, ear pain, hearing loss, nosebleeds, sinus pain, sore throat and tinnitus.    Eyes:  Negative for blurred vision, double vision, photophobia and pain.   Respiratory:  Negative for cough, hemoptysis, sputum production, shortness of breath, wheezing and stridor.    Cardiovascular:  Negative for chest pain, palpitations, orthopnea, claudication, leg swelling and PND.   Gastrointestinal:  Positive for blood in stool and melena. Negative for abdominal pain, constipation, diarrhea, heartburn, nausea and vomiting.   Genitourinary:  Negative for dysuria, flank pain, frequency,  hematuria and urgency.   Musculoskeletal:  Negative for back pain, falls, joint pain, myalgias and neck pain.   Skin:  Negative for itching and rash.   Neurological:  Negative for dizziness, tingling, tremors, weakness and headaches.   Endo/Heme/Allergies:  Negative for environmental allergies and polydipsia. Does not bruise/bleed easily.   Psychiatric/Behavioral:  Negative for depression, hallucinations, substance abuse and suicidal ideas.        Past Medical History   has no past medical history on file.    Surgical History   has no past surgical history on file.     Family History  family history is not on file.   Family history reviewed with patient. There is no family history that is pertinent to the chief complaint.     Social History       Allergies  Allergies   Allergen Reactions    Hydrocodone Unspecified     Allergy to Vicodin    Neosporin [Bacitracin-Polymyxin B] Unspecified     Patient unaware of reaction       Medications  Prior to Admission Medications   Prescriptions Last Dose Informant Patient Reported? Taking?   Artificial Tear Solution (GENTEAL TEARS) 0.1-0.2-0.3 % Solution unknown at unknown Other Facility Yes Yes   Sig: Administer 1 Drop into affected eye(s) 4 times a day as needed. Indications: Excessive Cornea and Conjunctiva Dryness   QUEtiapine (SEROQUEL) 25 MG Tab unknown at unknown Other Facility Yes Yes   Sig: Take 25 mg by mouth 2 times a day. 0800  1900   QUEtiapine (SEROQUEL) 25 MG Tab unknwon at unknown Other Facility Yes Yes   Sig: Take 25 mg by mouth 1 time a day as needed. * may take an extra dose if needed  Indications: Agitation   amLODIPine (NORVASC) 5 MG Tab unknown at unknown Other Facility Yes Yes   Sig: Take 5 mg by mouth 2 times a day. 0800  2000   apixaban (ELIQUIS) 5mg Tab unknown at unknown Other Facility Yes Yes   Sig: Take 2.5 mg by mouth 2 times a day. .5 tab = 2.5 mg   clopidogrel (PLAVIX) 75 MG Tab unknown at unknown Other Facility Yes Yes   Sig: Take 75 mg by mouth  every day.   galantamine (RAZADYNE ER) 16 MG ER capsule unknown at unknown Other Facility Yes Yes   Sig: Take 16 mg by mouth every morning with breakfast. 0800   hydrOXYzine HCl (ATARAX) 10 MG Tab unknown at unknown Other Facility Yes Yes   Sig: Take 10 mg by mouth every 6 hours. * scheduled*     0600     1200     1800     0000   levetiracetam (KEPPRA) 1000 MG tablet unknown at unknown Other Facility Yes Yes   Sig: Take 1,000 mg by mouth 2 times a day. 0800  1900   metoprolol SR (TOPROL XL) 25 MG TABLET SR 24 HR unknown at unknown Other Facility Yes Yes   Sig: Take 25 mg by mouth every day. 0800   mycophenolate (CELLCEPT) 500 MG tablet unknown at unknown Other Facility Yes Yes   Sig: Take 500 mg by mouth 2 times a day. 0800  1900   rosuvastatin (CRESTOR) 40 MG tablet unknown at unknown Other Facility Yes Yes   Sig: Take 40 mg by mouth every day. 0800   sertraline (ZOLOFT) 100 MG Tab unknown at unknown Other Facility Yes Yes   Sig: Take 100 mg by mouth every day.   terazosin (HYTRIN) 2 MG Cap unknown at unknown Other Facility Yes Yes   Sig: Take 4 mg by mouth every evening. 2 mg capsules x 2 = 4 mg      Facility-Administered Medications: None       Physical Exam  Temp:  [36.5 °C (97.7 °F)] 36.5 °C (97.7 °F)  Pulse:  [67-81] 81  Resp:  [15] 15  BP: (133-141)/(60-65) 140/63  SpO2:  [97 %-98 %] 97 %  Blood Pressure : (!) 140/63   Temperature: 36.5 °C (97.7 °F)   Pulse: 81   Respiration: 15   Pulse Oximetry: 97 %       Physical Exam  Vitals and nursing note reviewed.   Constitutional:       General: He is not in acute distress.     Appearance: Normal appearance. He is not ill-appearing, toxic-appearing or diaphoretic.   HENT:      Head: Normocephalic and atraumatic.      Nose: No congestion or rhinorrhea.      Mouth/Throat:      Pharynx: No posterior oropharyngeal erythema.   Eyes:      General: No scleral icterus.        Right eye: No discharge.   Cardiovascular:      Rate and Rhythm: Normal rate and regular rhythm.      " Pulses: Normal pulses.      Heart sounds: Normal heart sounds. No murmur heard.     No friction rub. No gallop.   Pulmonary:      Effort: Pulmonary effort is normal. No respiratory distress.      Breath sounds: Normal breath sounds. No stridor. No wheezing, rhonchi or rales.   Abdominal:      General: There is no distension.      Tenderness: There is no abdominal tenderness.   Musculoskeletal:         General: No swelling, tenderness, deformity or signs of injury.      Cervical back: Normal range of motion.      Right lower leg: No edema.      Left lower leg: No edema.   Skin:     Capillary Refill: Capillary refill takes more than 3 seconds.      Coloration: Skin is not jaundiced or pale.      Findings: No bruising, erythema, lesion or rash.   Neurological:      General: No focal deficit present.      Mental Status: He is alert and oriented to person, place, and time.         Laboratory:  Recent Labs     12/22/23  1700   WBC 7.4   RBC 2.94*   HEMOGLOBIN 8.4*   HEMATOCRIT 25.5*   MCV 86.7   MCH 28.6   MCHC 32.9   RDW 44.1   PLATELETCT 218   MPV 9.4     Recent Labs     12/22/23  1700   SODIUM 136   POTASSIUM 4.2   CHLORIDE 103   CO2 23   GLUCOSE 97   BUN 20   CREATININE 0.80   CALCIUM 8.6     Recent Labs     12/22/23  1700   ALTSGPT 23   ASTSGOT 29   ALKPHOSPHAT 77   TBILIRUBIN 0.3   LIPASE 66   GLUCOSE 97     Recent Labs     12/22/23  1700   APTT 35.5   INR 1.17*     No results for input(s): \"NTPROBNP\" in the last 72 hours.      No results for input(s): \"TROPONINT\" in the last 72 hours.    Imaging:  No orders to display       X-Ray:  I have personally reviewed the images and compared with prior images.  EKG:  I have personally reviewed the images and compared with prior images.    Assessment/Plan:  Justification for Admission Status  I anticipate this patient will require at least two midnights for appropriate medical management, necessitating inpatient admission because GI bleed    Patient will need a Med/Surg bed " on MEDICAL service .  The need is secondary to GI bleed.    * GI bleed- (present on admission)  Assessment & Plan  With hematemesis and melena, likely upper GI source  NPO  Patient is started on IV Protonix  Monitor H&H every 8 hours, transfuse for hemoglobin less than 7  We will consult GI in the morning for endoscopic evaluation     Dementia (HCC)  Assessment & Plan  Frequent reorientation  Continue home Seroquel    Hypertension  Assessment & Plan  Continue home medications    Coronary artery disease  Assessment & Plan  Denies chest pain  Hold Plavix    AF (atrial fibrillation) ([facility])  Assessment & Plan  Controlled  Hold Eliquis    Acute blood loss anemia  Assessment & Plan  Monitor hemoglobin every 8 hours and transfuse less than 7        VTE prophylaxis: SCDs/TEDs

## 2023-12-23 NOTE — OR NURSING
Patient arrived to PACU from OR in stable condition.   Report received at 1132.   VSS and initial assessment complete. Resting comfortably.  1145 pt awake, denies pain or nausea. Ready to return to floor status.    Report given to Slava Palomo opportunity given for questions.   Patient discharged to S6 in stable condition, consistent with preoperative status.

## 2023-12-23 NOTE — CONSULTS
Date of Consultation:  12/22/2023    Patient: : Alexa Mcclelland  MRN: [id number]    Referring Physician:  Dr. Jer Fung     GI:Tl Marie M.D.     Reason for Consultation: GI bleeding     History of Present Illness: The patient is a 83 years old gentleman with medical history of afib, coronary artery disease ,n-STEMI at the [facility] about a year ago, active taking Plavix and Eliquis, last dose likely December 22 morning or December 21 afternoon, no NSAID use, presented to inpatient GI service for melena for 3 days.    Patient started to have some discomfort in the abdomen couple days ago since the starting 3 days ago patient had a fatigue, but no nausea or vomiting, seen by patient's wife.  Minimal abdominal discomfort.    GI team saw the patient at bedside, the patient is about to sleep.  No bowel movement since admission.    Soft abdomen.  No active GI symptoms side.  Never had upper GI scope before.  Colonoscopy likely around 10 years ago.      No past medical history on file.      No past surgical history on file.    No family history on file.    Social History     Socioeconomic History    Marital status:        Limited review of system because patient is about to sleep.     HEENT: grossly normal.  Cardiovascular: Please refer to primary team's daily assessment.   Lungs: Please refer to primary team's daily assessment.   Abdomen: Soft, No tenderness.  Skin: No erythema, No rash.  Lower limbs: normal, no pitting edema.   Neurologic: Alert & oriented x 3, Normal motor function, No focal deficits noted.  PSY: stable mood.       Physical Exam:  Vitals:    12/22/23 1643 12/22/23 1808 12/22/23 1908 12/22/23 2133   BP: (!) 141/65 133/60 (!) 140/63 131/78   Pulse: 68 67 81 92   Resp: 15   18   Temp: 36.5 °C (97.7 °F)   36.7 °C (98 °F)   TempSrc: Temporal   Temporal   SpO2: 98% 98% 97% 94%   Weight:       Height:                 Labs:  Recent Labs     12/22/23  1700   WBC 7.4   RBC 2.94*   HEMOGLOBIN  8.4*   HEMATOCRIT 25.5*   MCV 86.7   MCH 28.6   MCHC 32.9   RDW 44.1   PLATELETCT 218   MPV 9.4     Recent Labs     12/22/23  1700   SODIUM 136   POTASSIUM 4.2   CHLORIDE 103   CO2 23   GLUCOSE 97   BUN 20     Recent Labs     12/22/23  1700   APTT 35.5   INR 1.17*       Recent Labs     12/22/23  1700   ASTSGOT 29   ALTSGPT 23   TBILIRUBIN 0.3   ALKPHOSPHAT 77   GLOBULIN 2.8   INR 1.17*         Imaging:  DX-KNEE COMPLETE 4+ RIGHT  Narrative: 10/28/2023 11:47 PM    HISTORY/REASON FOR EXAM:  Pain/Deformity Following Trauma.  Fall, pain.    TECHNIQUE/EXAM DESCRIPTION AND NUMBER OF VIEWS:  3 views of the RIGHT knee.    COMPARISON: None    FINDINGS:  No focal soft tissue swelling or gross joint effusion.  No fracture or dislocation.  Vascular calcifications.  Multiple soft tissue calcifications, more extensive in the LEFT lower extremity, likely chronic.  Impression: No fracture or dislocation of RIGHT knee.  DX-FEMUR-2+ RIGHT  Narrative: 10/28/2023 11:47 PM    HISTORY/REASON FOR EXAM:  Pain/Deformity Following Trauma.  Fall, hip pain.    TECHNIQUE/EXAM DESCRIPTION AND NUMBER OF VIEWS:  2 views of the RIGHT femur.    COMPARISON: Pelvis 10/29/2023    FINDINGS:  Femur is intact.  No dislocation.  Diffuse vascular calcifications.  Soft tissue calcification in both thighs, or extensive on the LEFT, likely chronic.  Impression: No RIGHT femur fracture.  DX-PELVIS-1 OR 2 VIEWS  Narrative: 10/28/2023 11:47 PM    HISTORY/REASON FOR EXAM:  Pelvic/Hip Pain Following Trauma.  Fall.    TECHNIQUE/EXAM DESCRIPTION AND NUMBER OF VIEWS:  1 view(s) of the pelvis.    COMPARISON:  None.    FINDINGS:  Degenerative change of lower lumbar spine.  Pelvis is intact.  Sacrum is intact.  Peripherally sclerotic lesion in the LEFT proximal femur.  Diffuse vascular calcification.  Impression: 1.  No pelvic fracture.  2.  Peripherally sclerotic lesion in the LEFT proximal femur most likely enchondroma although exact etiology is  uncertain.  DX-ELBOW-COMPLETE 3+ LEFT  Narrative: 10/28/2023 11:47 PM    HISTORY/REASON FOR EXAM:  Pain/Deformity Following Trauma.  Fall, LEFT elbow pain.    TECHNIQUE/EXAM DESCRIPTION AND NUMBER OF VIEWS:  3 views of the LEFT elbow.    COMPARISON: None    FINDINGS:  No focal soft tissue swelling or displaced fat pad.  No fracture or dislocation.  No radiopaque foreign body.  Impression: No fracture or dislocation of LEFT elbow.            Impressions:  The patient is a 83 years old gentleman with medical history of afib, coronary artery disease ,n-STEMI at the [facility] about a year ago, active taking Plavix and Eliquis, last dose likely December 22 morning or December 21 afternoon, no NSAID use, presented to inpatient GI service for melena for 3 days.    Melena was noted in the last few days.  Not much upper GI symptoms or signs.  Previous peptic ulcer decades ago.  Last colonoscopy likely 10 years ago.  Hemoglobin right now 8.4 from the baseline around 11.     IV PPI twice daily dose with stat, supportive care, n.p.o. or clear liquid now, midnight n.p.o. for possible upper GI scope December 23 morning.  If brisk bleeding is noted, consider reversing blood thinner and ICU admission.      Diagnosis: upper gastrointestinal bleeding.   Procedure: Esophagogastroduodenoscopy with bleeding control including banding.         This note was generated using voice recognition software which has a small chance of producing errors of grammar and possibly content. I have made every reasonable attempt to find and correct any obvious errors, but expect that some may not be found prior to finalization of this note.

## 2023-12-23 NOTE — ED NOTES
Bedside report received from off going RN/tech: Amna , assumed care of patient.  POC discussed with patient. Call light within reach, all needs addressed at this time.       Fall risk interventions in place: Move the patient closer to the nurse's station, Patient's personal possessions are with in their safe reach, Place socks on patient, Place fall risk sign on patient's door, Give patient urinal if applicable, Keep floor surfaces clean and dry, Accompanied to restroom, and Bed Alarm in use (all applicable per [place] Fall risk assessment)   Continuous monitoring: Cardiac Leads, Pulse Ox, or Blood Pressure  IVF/IV medications: Not Applicable   Oxygen: Room Air  Bedside sitter: Not Applicable   Isolation: Not Applicable

## 2023-12-23 NOTE — ED NOTES
Med rec update and complete. Allergies reviewed. Per list from   [place]. No MARS arrived with pt. Unable to reach facility to request MARS.  Unable to verify last doses taken.        [facility] = [phone]

## 2023-12-23 NOTE — ANESTHESIA POSTPROCEDURE EVALUATION
Patient: Alexa Mcclelland    Procedure Summary       Date: 12/23/23 Room / Location: [facility] 06 / SURGERY [facility]    Anesthesia Start: 1118 Anesthesia Stop: 1135    Procedure: GASTROSCOPY (Esophagus) Diagnosis: (duodenitis, esophageal varices, hiatal hernia)    Surgeons: Vish Anguiano M.D. Responsible Provider: Jordy Talavera M.D.    Anesthesia Type: general ASA Status: 3            Final Anesthesia Type: general  Last vitals  BP   Blood Pressure : 92/55    Temp   36.5 °C (97.7 °F)    Pulse   60   Resp   16    SpO2   100 %      Anesthesia Post Evaluation    Patient location during evaluation: PACU  Patient participation: complete - patient participated  Level of consciousness: awake and alert  Pain score: 0    Airway patency: patent  Anesthetic complications: no  Cardiovascular status: hemodynamically stable  Respiratory status: acceptable  Hydration status: euvolemic    PONV: none        No notable events documented.     Nurse Pain Score: 0 (NPRS)

## 2023-12-23 NOTE — PROGRESS NOTES
Pt arrived to unit via gurney with transport without incident. Pt is alert and oriented to self and place, but not time. Pt was refusing to transfer over to the hospital bed. Pt kept staying that he wanted to go back to his hotel room and he was not staying here. After some convincing, pt stood and pivoted onto hospital bed. Assessment completed. Respirations are even and unlabored on room air. Patient denies pain. Call light and belongings are within reach. Pt educated on room and call light. Needs met.

## 2023-12-23 NOTE — OP REPORT
OPERATIVE REPORT    PATIENT:   Alexa Mcclelland   1940       PREOPERATIVE DIAGNOSES/INDICATIONS: Melena, anemia due to GI blood loss    PROCEDURE: EGD, diagnostic    PHYSICIAN:  Vish Anguiano MD     ANESTHESIA:  Per anesthesiologist.  [facility]    LOCATION: [facility]    CONSENT: The risks, benefits and alternatives of the procedure were discussed in detail. The risks include and are not limited to bleeding, infection, perforation, missed lesions, and sedations risks (cardiopulmonary compromise and allergic reaction to medications).    DESCRIPTION:   The patient presented to the operating room.  A time out was performed prior to beginning the procedure.   The patient was placed in the left lateral recumbent position. Patient was sedated by anesthesia: Propofol.    OPERATIVE FINDINGS:    Esophagus: Distal 3 cm with salmon-colored mucosa consistent with Hall's esophagus.  Not biopsied given recent Plavix and apixaban use.  Remainder of esophagus normal.  Stomach: Sliding type hiatal hernia 2 to 3 cm.  The antrum/prepyloric region of the stomach with patchy gastritis.  Remainder of stomach normal.  Duodenum: Duodenitis localized to the D1-D2 transition zone.  No stigmata of recent bleeding.  No higher stigmata.  The remainder of D1 and D2 are normal.    Blood loss: None    The patient tolerated the procedure well.      There were no immediate complications.    Pathology samples obtained: None    IMPRESSION:  Hall's esophagus, 3 cm.  Not biopsied given direct oral anticoagulant and antiplatelet use.  Sliding-type hiatal hernia.  Patchy gastritis isolated to the antrum and prepyloric region.  Localized duodenitis at the D1-D2 transition.    RECOMMENDATIONS:  PPI therapy  Consider relook upper endoscopy in 4 to 6 weeks.  Needs to be off antiplatelet x 5 days and DOAC x 48 hours prior to considering biopsies.  Proceed with colonoscopy tomorrow given melena and anemia.  Bowel preparation this evening.  N.p.o. after  midnight.

## 2023-12-23 NOTE — PROGRESS NOTES
Patient: : Alexa Mcclelland  MRN: [id number]    Diagnosis: Melena, upper GI bleeding, received Plavix and apixaban this morning.    GI team discussed with emergency provider regarding the patient's possible upper GI bleeding.  Melena was noted in the last few days.  Not much upper GI symptoms or signs.  Previous peptic ulcer decades ago.  Last colonoscopy likely 10 years ago.  Hemoglobin right now 8.4 from the baseline around 11.    IV PPI twice daily dose with stat, supportive care, n.p.o. or clear liquid now, midnight n.p.o. for possible upper GI scope December 23 morning.  If brisk bleeding is noted, consider reversing blood thinner and ICU admission.     Labs:  Recent Labs     12/22/23  1700   WBC 7.4   RBC 2.94*   HEMOGLOBIN 8.4*   HEMATOCRIT 25.5*   MCV 86.7   MCH 28.6   MCHC 32.9   RDW 44.1   PLATELETCT 218   MPV 9.4     Recent Labs     12/22/23  1700   SODIUM 136   POTASSIUM 4.2   CHLORIDE 103   CO2 23   GLUCOSE 97   BUN 20           Recent Labs     12/22/23  1700   ASTSGOT 29   ALTSGPT 23   TBILIRUBIN 0.3   ALKPHOSPHAT 77   GLOBULIN 2.8

## 2023-12-23 NOTE — PROGRESS NOTES
Hospital Medicine Daily Progress Note    Date of Service  12/23/2023    Chief Complaint  Alexa Mcclelland is a 83 y.o. male admitted 12/22/2023 with bloody stools    Hospital Course   83 y.o. male who presented 12/22/2023 with past medical history of coronary artery disease, dementia, chronic inflammatory demyelinating polyneuritis, history of A-fib, wheelchair bound, who presents to the hospital for 3 days of melena.  Patient lives in assisted living facility. Patient does take Plavix and Eliquis as an outpatient.  1 year ago he had a NSTEMI at the [facility].  Cardiac stents were not placed at the time. Last colonoscopy likely 10 years ago.   GI was consulted, s/p EGD 12/23 Hall's esophagus, Patchy gastritis isolated to the antrum and prepyloric region. Localized duodenitis    Interval Problem Update  Seen patient and wife at bedside  Denies bloody stool today   Patient is status post EGD today  Discussed with GI, planning colonoscopy tomorrow  Continue trending H&H  Check iron profile, B12    I have discussed this patient's plan of care and discharge plan at IDT rounds today with Case Management, Nursing, Nursing leadership, and other members of the IDT team.    Consultants/Specialty  GI    Code Status  DNAR/DNI    Disposition  The patient is not medically cleared for discharge to home or a post-acute facility.      I have placed the appropriate orders for post-discharge needs.    Review of Systems  Review of Systems   Unable to perform ROS: Dementia        Physical Exam  Temp:  [35.8 °C (96.5 °F)-36.7 °C (98 °F)] 35.8 °C (96.5 °F)  Pulse:  [58-92] 65  Resp:  [13-18] 18  BP: ()/(42-78) 96/47  SpO2:  [91 %-100 %] 98 %    Physical Exam  Vitals and nursing note reviewed.   Constitutional:       Appearance: Normal appearance.   HENT:      Head: Normocephalic and atraumatic.      Mouth/Throat:      Pharynx: Oropharynx is clear.   Eyes:      Pupils: Pupils are equal, round, and reactive to light.   Neck:       Vascular: No carotid bruit.   Cardiovascular:      Rate and Rhythm: Normal rate and regular rhythm.   Pulmonary:      Effort: Pulmonary effort is normal.      Breath sounds: Normal breath sounds.   Abdominal:      General: Abdomen is flat. Bowel sounds are normal. There is no distension.      Palpations: Abdomen is soft. There is no mass.   Musculoskeletal:         General: Normal range of motion.      Cervical back: Neck supple.   Skin:     General: Skin is warm and dry.      Coloration: Skin is pale.   Neurological:      General: No focal deficit present.      Mental Status: He is alert.      Comments: Following commands   Psychiatric:      Comments: Unable to assess     Noted    Fluids    Intake/Output Summary (Last 24 hours) at 12/23/2023 1521  Last data filed at 12/23/2023 1141  Gross per 24 hour   Intake 45.33 ml   Output 100 ml   Net -54.67 ml       Laboratory  Recent Labs     12/22/23  1700 12/23/23  0400 12/23/23  1237   WBC 7.4 7.0  --    RBC 2.94* 2.66*  --    HEMOGLOBIN 8.4* 7.6* 7.2*   HEMATOCRIT 25.5* 23.5*  --    MCV 86.7 88.3  --    MCH 28.6 28.6  --    MCHC 32.9 32.3  --    RDW 44.1 45.8  --    PLATELETCT 218 192  --    MPV 9.4 9.5  --      Recent Labs     12/22/23  1700 12/23/23  0400   SODIUM 136 136   POTASSIUM 4.2 3.8   CHLORIDE 103 104   CO2 23 22   GLUCOSE 97 103*   BUN 20 18   CREATININE 0.80 0.83   CALCIUM 8.6 8.3*     Recent Labs     12/22/23  1700   APTT 35.5   INR 1.17*               Imaging  No orders to display        Assessment/Plan  * GI bleed- (present on admission)  Assessment & Plan  With hematemesis and melena, likely upper GI source  Patient is started on IV Protonix  Monitor H&H every 8 hours, transfuse for hemoglobin less than 7  GI consulted, status post EGD on 12/23 Hall's esophagus, Patchy gastritis isolated to the antrum and prepyloric region. Localized duodenitis  GI planning colonoscopy on 12/24    Advance care planning  Assessment & Plan  I have discussed with  patient's wife for advance care planning.  Patient has dementia.  Per his wife, the patient wishes to be DNR/DNI in the event that he has cardiac/pulmonary arrest    I discussed advance care planning with the patient's family for 16 minutes, including diagnosis, prognosis, plan of care, risks and benefits of any therapies that could be offered, as well as alternatives including palliation and hospice, as appropriate.        Dementia (HCC)  Assessment & Plan  Frequent reorientation  Continue home Seroquel    Hypertension  Assessment & Plan  Continue home medications    Coronary artery disease  Assessment & Plan  Denies chest pain  Hold Plavix    AF (atrial fibrillation) ([facility])  Assessment & Plan  Rate controlled  Hold Eliquis    Acute blood loss anemia  Assessment & Plan  Monitor hemoglobin every 8 hours and transfuse less than 7  Check iron profile and vitamin B12         VTE prophylaxis:   SCDs/TEDs      I have performed a physical exam and reviewed and updated ROS and Plan today (12/23/2023). In review of yesterday's note (12/22/2023), there are no changes except as documented above.    Patient is has a high medical complexity, complex decision making and is at high risk for complication, morbidity, and mortality.    My total time spent caring for the patient on the day of the encounter was 67 minutes.   This does not include time spent on separately billable procedures/tests.

## 2023-12-23 NOTE — ASSESSMENT & PLAN NOTE
Denies chest pain  Hold Plavix  Discussed with GI and wife, agreed to discontinue Plavix.  Plavix discontinued

## 2023-12-24 ENCOUNTER — ANESTHESIA EVENT (OUTPATIENT)
Dept: SURGERY | Facility: MEDICAL CENTER | Age: 83
DRG: 378 | End: 2023-12-24
Payer: COMMERCIAL

## 2023-12-24 ENCOUNTER — ANESTHESIA (OUTPATIENT)
Dept: SURGERY | Facility: MEDICAL CENTER | Age: 83
DRG: 378 | End: 2023-12-24
Payer: COMMERCIAL

## 2023-12-24 LAB
ANION GAP SERPL CALC-SCNC: 11 MMOL/L (ref 7–16)
BUN SERPL-MCNC: 16 MG/DL (ref 8–22)
CALCIUM SERPL-MCNC: 8.1 MG/DL (ref 8.5–10.5)
CHLORIDE SERPL-SCNC: 105 MMOL/L (ref 96–112)
CO2 SERPL-SCNC: 22 MMOL/L (ref 20–33)
CREAT SERPL-MCNC: 0.82 MG/DL (ref 0.5–1.4)
ERYTHROCYTE [DISTWIDTH] IN BLOOD BY AUTOMATED COUNT: 45.6 FL (ref 35.9–50)
GFR SERPLBLD CREATININE-BSD FMLA CKD-EPI: 86 ML/MIN/1.73 M 2
GLUCOSE SERPL-MCNC: 96 MG/DL (ref 65–99)
HCT VFR BLD AUTO: 24.2 % (ref 42–52)
HGB BLD-MCNC: 7.7 G/DL (ref 14–18)
HGB BLD-MCNC: 7.9 G/DL (ref 14–18)
MCH RBC QN AUTO: 28.2 PG (ref 27–33)
MCHC RBC AUTO-ENTMCNC: 31.8 G/DL (ref 32.3–36.5)
MCV RBC AUTO: 88.6 FL (ref 81.4–97.8)
PLATELET # BLD AUTO: 211 K/UL (ref 164–446)
PMV BLD AUTO: 9.3 FL (ref 9–12.9)
POTASSIUM SERPL-SCNC: 3.6 MMOL/L (ref 3.6–5.5)
RBC # BLD AUTO: 2.73 M/UL (ref 4.7–6.1)
SODIUM SERPL-SCNC: 138 MMOL/L (ref 135–145)
VIT B12 SERPL-MCNC: 1214 PG/ML (ref 211–911)
WBC # BLD AUTO: 6.9 K/UL (ref 4.8–10.8)

## 2023-12-24 PROCEDURE — 700105 HCHG RX REV CODE 258: Performed by: ANESTHESIOLOGY

## 2023-12-24 PROCEDURE — 770006 HCHG ROOM/CARE - MED/SURG/GYN SEMI*

## 2023-12-24 PROCEDURE — 82607 VITAMIN B-12: CPT

## 2023-12-24 PROCEDURE — 160035 HCHG PACU - 1ST 60 MINS PHASE I: Performed by: INTERNAL MEDICINE

## 2023-12-24 PROCEDURE — 700111 HCHG RX REV CODE 636 W/ 250 OVERRIDE (IP): Performed by: HOSPITALIST

## 2023-12-24 PROCEDURE — 160009 HCHG ANES TIME/MIN: Performed by: INTERNAL MEDICINE

## 2023-12-24 PROCEDURE — 700111 HCHG RX REV CODE 636 W/ 250 OVERRIDE (IP): Performed by: ANESTHESIOLOGY

## 2023-12-24 PROCEDURE — 700101 HCHG RX REV CODE 250: Performed by: ANESTHESIOLOGY

## 2023-12-24 PROCEDURE — 160002 HCHG RECOVERY MINUTES (STAT): Performed by: INTERNAL MEDICINE

## 2023-12-24 PROCEDURE — 85018 HEMOGLOBIN: CPT

## 2023-12-24 PROCEDURE — 36415 COLL VENOUS BLD VENIPUNCTURE: CPT

## 2023-12-24 PROCEDURE — 160203 HCHG ENDO MINUTES - 1ST 30 MINS LEVEL 4: Performed by: INTERNAL MEDICINE

## 2023-12-24 PROCEDURE — 160048 HCHG OR STATISTICAL LEVEL 1-5: Performed by: INTERNAL MEDICINE

## 2023-12-24 PROCEDURE — 97530 THERAPEUTIC ACTIVITIES: CPT

## 2023-12-24 PROCEDURE — A9270 NON-COVERED ITEM OR SERVICE: HCPCS | Performed by: HOSPITALIST

## 2023-12-24 PROCEDURE — 160208 HCHG ENDO MINUTES - EA ADDL 1 MIN LEVEL 4: Performed by: INTERNAL MEDICINE

## 2023-12-24 PROCEDURE — 45380 COLONOSCOPY AND BIOPSY: CPT | Mod: 59 | Performed by: INTERNAL MEDICINE

## 2023-12-24 PROCEDURE — 0DBH8ZX EXCISION OF CECUM, VIA NATURAL OR ARTIFICIAL OPENING ENDOSCOPIC, DIAGNOSTIC: ICD-10-PCS | Performed by: INTERNAL MEDICINE

## 2023-12-24 PROCEDURE — 88305 TISSUE EXAM BY PATHOLOGIST: CPT

## 2023-12-24 PROCEDURE — 80048 BASIC METABOLIC PNL TOTAL CA: CPT

## 2023-12-24 PROCEDURE — 700102 HCHG RX REV CODE 250 W/ 637 OVERRIDE(OP): Performed by: HOSPITALIST

## 2023-12-24 PROCEDURE — 97162 PT EVAL MOD COMPLEX 30 MIN: CPT

## 2023-12-24 PROCEDURE — 85027 COMPLETE CBC AUTOMATED: CPT

## 2023-12-24 PROCEDURE — C9113 INJ PANTOPRAZOLE SODIUM, VIA: HCPCS | Performed by: HOSPITALIST

## 2023-12-24 PROCEDURE — 99233 SBSQ HOSP IP/OBS HIGH 50: CPT | Performed by: STUDENT IN AN ORGANIZED HEALTH CARE EDUCATION/TRAINING PROGRAM

## 2023-12-24 RX ORDER — SODIUM CHLORIDE, SODIUM GLUCONATE, SODIUM ACETATE, POTASSIUM CHLORIDE AND MAGNESIUM CHLORIDE 526; 502; 368; 37; 30 MG/100ML; MG/100ML; MG/100ML; MG/100ML; MG/100ML
500 INJECTION, SOLUTION INTRAVENOUS CONTINUOUS
Status: DISCONTINUED | OUTPATIENT
Start: 2023-12-24 | End: 2023-12-24 | Stop reason: HOSPADM

## 2023-12-24 RX ORDER — MEPERIDINE HYDROCHLORIDE 25 MG/ML
12.5 INJECTION INTRAMUSCULAR; INTRAVENOUS; SUBCUTANEOUS
Status: DISCONTINUED | OUTPATIENT
Start: 2023-12-24 | End: 2023-12-24 | Stop reason: HOSPADM

## 2023-12-24 RX ORDER — SODIUM CHLORIDE, SODIUM LACTATE, POTASSIUM CHLORIDE, CALCIUM CHLORIDE 600; 310; 30; 20 MG/100ML; MG/100ML; MG/100ML; MG/100ML
INJECTION, SOLUTION INTRAVENOUS
Status: DISCONTINUED | OUTPATIENT
Start: 2023-12-24 | End: 2023-12-24 | Stop reason: SURG

## 2023-12-24 RX ORDER — HYDROMORPHONE HYDROCHLORIDE 1 MG/ML
0.2 INJECTION, SOLUTION INTRAMUSCULAR; INTRAVENOUS; SUBCUTANEOUS
Status: DISCONTINUED | OUTPATIENT
Start: 2023-12-24 | End: 2023-12-24 | Stop reason: HOSPADM

## 2023-12-24 RX ORDER — EPHEDRINE SULFATE 50 MG/ML
INJECTION, SOLUTION INTRAVENOUS PRN
Status: DISCONTINUED | OUTPATIENT
Start: 2023-12-24 | End: 2023-12-24 | Stop reason: SURG

## 2023-12-24 RX ORDER — OXYCODONE HCL 5 MG/5 ML
10 SOLUTION, ORAL ORAL
Status: DISCONTINUED | OUTPATIENT
Start: 2023-12-24 | End: 2023-12-24 | Stop reason: HOSPADM

## 2023-12-24 RX ORDER — CALCIUM CHLORIDE 100 MG/ML
INJECTION INTRAVENOUS; INTRAVENTRICULAR PRN
Status: DISCONTINUED | OUTPATIENT
Start: 2023-12-24 | End: 2023-12-24 | Stop reason: SURG

## 2023-12-24 RX ORDER — HYDROMORPHONE HYDROCHLORIDE 1 MG/ML
1 INJECTION, SOLUTION INTRAMUSCULAR; INTRAVENOUS; SUBCUTANEOUS
Status: DISCONTINUED | OUTPATIENT
Start: 2023-12-24 | End: 2023-12-24 | Stop reason: HOSPADM

## 2023-12-24 RX ORDER — DIPHENHYDRAMINE HYDROCHLORIDE 50 MG/ML
12.5 INJECTION INTRAMUSCULAR; INTRAVENOUS
Status: DISCONTINUED | OUTPATIENT
Start: 2023-12-24 | End: 2023-12-24 | Stop reason: HOSPADM

## 2023-12-24 RX ORDER — OXYCODONE HCL 5 MG/5 ML
5 SOLUTION, ORAL ORAL
Status: DISCONTINUED | OUTPATIENT
Start: 2023-12-24 | End: 2023-12-24 | Stop reason: HOSPADM

## 2023-12-24 RX ORDER — HALOPERIDOL 5 MG/ML
1 INJECTION INTRAMUSCULAR
Status: DISCONTINUED | OUTPATIENT
Start: 2023-12-24 | End: 2023-12-24 | Stop reason: HOSPADM

## 2023-12-24 RX ORDER — HYDROMORPHONE HYDROCHLORIDE 1 MG/ML
0.4 INJECTION, SOLUTION INTRAMUSCULAR; INTRAVENOUS; SUBCUTANEOUS
Status: DISCONTINUED | OUTPATIENT
Start: 2023-12-24 | End: 2023-12-24 | Stop reason: HOSPADM

## 2023-12-24 RX ORDER — MIDAZOLAM HYDROCHLORIDE 1 MG/ML
1 INJECTION INTRAMUSCULAR; INTRAVENOUS
Status: DISCONTINUED | OUTPATIENT
Start: 2023-12-24 | End: 2023-12-24 | Stop reason: HOSPADM

## 2023-12-24 RX ORDER — IPRATROPIUM BROMIDE AND ALBUTEROL SULFATE 2.5; .5 MG/3ML; MG/3ML
3 SOLUTION RESPIRATORY (INHALATION)
Status: DISCONTINUED | OUTPATIENT
Start: 2023-12-24 | End: 2023-12-24 | Stop reason: HOSPADM

## 2023-12-24 RX ORDER — LIDOCAINE HYDROCHLORIDE 20 MG/ML
INJECTION, SOLUTION EPIDURAL; INFILTRATION; INTRACAUDAL; PERINEURAL PRN
Status: DISCONTINUED | OUTPATIENT
Start: 2023-12-24 | End: 2023-12-24 | Stop reason: SURG

## 2023-12-24 RX ORDER — ONDANSETRON 2 MG/ML
4 INJECTION INTRAMUSCULAR; INTRAVENOUS
Status: DISCONTINUED | OUTPATIENT
Start: 2023-12-24 | End: 2023-12-24 | Stop reason: HOSPADM

## 2023-12-24 RX ORDER — CALCIUM CARBONATE 500 MG/1
500 TABLET, CHEWABLE ORAL 3 TIMES DAILY PRN
Status: DISCONTINUED | OUTPATIENT
Start: 2023-12-24 | End: 2023-12-26 | Stop reason: HOSPADM

## 2023-12-24 RX ADMIN — PROPOFOL 20 MG: 10 INJECTION, EMULSION INTRAVENOUS at 10:16

## 2023-12-24 RX ADMIN — PANTOPRAZOLE SODIUM 40 MG: 40 INJECTION, POWDER, FOR SOLUTION INTRAVENOUS at 04:33

## 2023-12-24 RX ADMIN — TERAZOSIN HYDROCHLORIDE 4 MG: 1 CAPSULE ORAL at 19:47

## 2023-12-24 RX ADMIN — CALCIUM CHLORIDE 500 MG: 100 INJECTION, SOLUTION INTRAVENOUS; INTRAVENTRICULAR at 09:46

## 2023-12-24 RX ADMIN — PROPOFOL 50 MG: 10 INJECTION, EMULSION INTRAVENOUS at 09:44

## 2023-12-24 RX ADMIN — SODIUM CHLORIDE, POTASSIUM CHLORIDE, SODIUM LACTATE AND CALCIUM CHLORIDE: 600; 310; 30; 20 INJECTION, SOLUTION INTRAVENOUS at 09:35

## 2023-12-24 RX ADMIN — AMLODIPINE BESYLATE 5 MG: 5 TABLET ORAL at 17:20

## 2023-12-24 RX ADMIN — SERTRALINE 100 MG: 100 TABLET, FILM COATED ORAL at 04:33

## 2023-12-24 RX ADMIN — SODIUM CHLORIDE, POTASSIUM CHLORIDE, SODIUM LACTATE AND CALCIUM CHLORIDE: 600; 310; 30; 20 INJECTION, SOLUTION INTRAVENOUS at 09:44

## 2023-12-24 RX ADMIN — PANTOPRAZOLE SODIUM 40 MG: 40 INJECTION, POWDER, FOR SOLUTION INTRAVENOUS at 17:20

## 2023-12-24 RX ADMIN — QUETIAPINE FUMARATE 25 MG: 25 TABLET ORAL at 04:33

## 2023-12-24 RX ADMIN — LEVETIRACETAM 1000 MG: 500 TABLET, FILM COATED ORAL at 04:33

## 2023-12-24 RX ADMIN — QUETIAPINE FUMARATE 25 MG: 25 TABLET ORAL at 17:20

## 2023-12-24 RX ADMIN — EPHEDRINE SULFATE 10 MG: 50 INJECTION, SOLUTION INTRAVENOUS at 09:54

## 2023-12-24 RX ADMIN — LEVETIRACETAM 1000 MG: 500 TABLET, FILM COATED ORAL at 17:20

## 2023-12-24 RX ADMIN — ONDANSETRON 4 MG: 2 INJECTION INTRAMUSCULAR; INTRAVENOUS at 22:54

## 2023-12-24 RX ADMIN — LIDOCAINE HYDROCHLORIDE 40 MG: 20 INJECTION, SOLUTION EPIDURAL; INFILTRATION; INTRACAUDAL at 09:44

## 2023-12-24 ASSESSMENT — COGNITIVE AND FUNCTIONAL STATUS - GENERAL
CLIMB 3 TO 5 STEPS WITH RAILING: TOTAL
STANDING UP FROM CHAIR USING ARMS: A LITTLE
MOVING TO AND FROM BED TO CHAIR: A LOT
HELP NEEDED FOR BATHING: A LOT
SUGGESTED CMS G CODE MODIFIER MOBILITY: CL
TURNING FROM BACK TO SIDE WHILE IN FLAT BAD: A LOT
SUGGESTED CMS G CODE MODIFIER DAILY ACTIVITY: CL
MOBILITY SCORE: 12
DAILY ACTIVITIY SCORE: 12
CLIMB 3 TO 5 STEPS WITH RAILING: TOTAL
EATING MEALS: A LOT
SUGGESTED CMS G CODE MODIFIER MOBILITY: CM
MOVING FROM LYING ON BACK TO SITTING ON SIDE OF FLAT BED: A LITTLE
WALKING IN HOSPITAL ROOM: TOTAL
WALKING IN HOSPITAL ROOM: TOTAL
MOVING TO AND FROM BED TO CHAIR: UNABLE
DRESSING REGULAR UPPER BODY CLOTHING: A LOT
DRESSING REGULAR LOWER BODY CLOTHING: A LOT
MOVING FROM LYING ON BACK TO SITTING ON SIDE OF FLAT BED: UNABLE
STANDING UP FROM CHAIR USING ARMS: A LOT
TOILETING: A LOT
PERSONAL GROOMING: A LOT
MOBILITY SCORE: 9
TURNING FROM BACK TO SIDE WHILE IN FLAT BAD: A LITTLE

## 2023-12-24 ASSESSMENT — PAIN DESCRIPTION - PAIN TYPE
TYPE: SURGICAL PAIN
TYPE: ACUTE PAIN
TYPE: ACUTE PAIN
TYPE: SURGICAL PAIN
TYPE: SURGICAL PAIN

## 2023-12-24 ASSESSMENT — GAIT ASSESSMENTS: GAIT LEVEL OF ASSIST: UNABLE TO PARTICIPATE

## 2023-12-24 ASSESSMENT — PAIN SCALES - GENERAL: PAIN_LEVEL: 0

## 2023-12-24 NOTE — PROGRESS NOTES
GI Progress Note    Patient and wife seen in pre-op.  Reviewed indication for procedure.  Reviewed risks.  Will leave note as to when we can resume Eliquis and Plavix.  History of melena x 3 d PTA.  Hgb 7.7 this am.

## 2023-12-24 NOTE — THERAPY
Physical Therapy Contact Note    Patient Name: Alexa Mcclelland  Age:  83 y.o., Sex:  male  Medical Record #: [id number]  Today's Date: 12/24/2023 12/24/23 4259   Initial Contact Note    Initial Contact Note Order Received and Verified, Physical Therapy Evaluation in Progress with Full Report to Follow.   Interdisciplinary Plan of Care Collaboration   Collaboration Comments PT Consult recieved. Per review of medical records, patient found to have GI bleed, pending colonoscopy today. PT to follow up at a later date as able.   Session Information   Date / Session Number  12/24-Hold (EVAL)

## 2023-12-24 NOTE — CARE PLAN
The patient is Stable - Low risk of patient condition declining or worsening    Shift Goals  Clinical Goals: remain free from falls  Patient Goals: rest  Family Goals: not present    Progress made toward(s) clinical / shift goals:    Problem: Skin Integrity  Goal: Skin integrity is maintained or improved  Outcome: Progressing     Problem: Fall Risk  Goal: Patient will remain free from falls  Outcome: Progressing

## 2023-12-24 NOTE — ANESTHESIA TIME REPORT
Anesthesia Start and Stop Event Times       Date Time Event    12/24/2023 0239 Ready for Procedure     0935 Anesthesia Start     1014 Anesthesia Stop          Responsible Staff  12/24/23      Name Role Begin End    Yury Hart III, M.D. Anesth 0935 1014          Overtime Reason:  no overtime (within assigned shift)    Comments:

## 2023-12-24 NOTE — PROGRESS NOTES
Received report from [place] RN, and assumed care of patient at shift change.     Assessment completed. Pt is A&O x 1. Pt denies any pain at this time. Pt laying in bed comfortably. Vital signs are stable. Fall precaution in place: Bed is in lowest, locked position, bed alarm is on, call light and belongings are within reach. Pt educated to call for assistance when getting out of bed. Plan of care discussed and all questions answered. All other needs met at this time. Care continuous.

## 2023-12-24 NOTE — ANESTHESIA POSTPROCEDURE EVALUATION
Patient: Alexa Mcclelland    Procedure Summary       Date: 12/24/23 Room / Location: [facility] 06 / SURGERY [facility]    Anesthesia Start: 0935 Anesthesia Stop: 1014    Procedures:       COLONOSCOPY (Anus)      COLONOSCOPY, WITH POLYPECTOMY (Anus) Diagnosis: (colon polyps, diverticulosis)    Surgeons: Tatiana Salazar M.D. Responsible Provider: Yury Hart III, M.D.    Anesthesia Type: general ASA Status: 3            Final Anesthesia Type: general  Last vitals  BP   Blood Pressure : 133/62    Temp   36.2 °C (97.1 °F)    Pulse   75   Resp   19    SpO2   96 %      Anesthesia Post Evaluation    Patient location during evaluation: PACU  Patient participation: complete - patient participated  Level of consciousness: awake and alert  Pain score: 0    Airway patency: patent  Anesthetic complications: no  Cardiovascular status: hemodynamically stable  Respiratory status: acceptable  Hydration status: euvolemic    PONV: none          No notable events documented.     Nurse Pain Score: 0 (NPRS)

## 2023-12-24 NOTE — ANESTHESIA PREPROCEDURE EVALUATION
Case: [id number] Date/Time: 12/24/23 0905    Procedure: COLONOSCOPY (Anus)    Anesthesia type: MAC    Pre-op diagnosis: GI bleed    Location: [facility] OR 06 / SURGERY [facility]    Surgeons: Tatiana Salazar M.D.            Relevant Problems   CARDIAC   (positive) AF (atrial fibrillation) (HCC)   (positive) Coronary artery disease   (positive) Hypertension       Physical Exam    Airway   Mallampati: III  TM distance: >3 FB  Neck ROM: limited       Cardiovascular - normal exam  Rhythm: regular  Rate: normal  (-) murmur     Dental - normal exam           Pulmonary - normal exam  Breath sounds clear to auscultation     Abdominal    Neurological - normal exam                   Anesthesia Plan    ASA 3       Plan - general       Airway plan will be natural airway          Induction: intravenous    Postoperative Plan: Postoperative administration of opioids is intended.    Pertinent diagnostic labs and testing reviewed    Informed Consent:    Anesthetic plan and risks discussed with patient.    Use of blood products discussed with: patient whom consented to blood products.           
pt arrives with EMS from home for possible infection bilat L/E/swelling of ankles

## 2023-12-24 NOTE — PROCEDURES
OPERATIVE REPORT        PATIENT: Alexa Mcclelland  1940      PREOPERATIVE DIAGNOSIS/INDICATION: melena 3 days prior to admission,     POSTOPERATIVE DIAGNOSES: colon polyps, diverticulosis    PROCEDURE: COLONOSCOPY    PHYSICIAN: Tatiana Salazar MD    CONSENT:  OBTAINED. The risks, benefits and alternatives of the procedure were discussed in details. The risks include and are not limited to bleeding, infection, perforation, missed lesions, and sedations risks (cardiopulmonary compromise and allergic reaction to medications).    ANESTHESIA:  Per anesthesiologist.    LOCATION: [facility]    DESCRIPTION:  The patient presented to the procedure room.  After routine checkup was performed, patient was brought into endoscopy suite.  Patient was placed on his left lateral decubitus position.  Patient was sedated by anesthesia. Vital signs were monitored throughout procedure.  Oxygenation support was provided throughout procedure. Digital rectal examination was performed.  Then, a colonoscope was inserted into patient's anus, advanced to the cecum under direct visualization.  Once the site was reached and examined, the colonoscope was withdrawn and procedure was terminated. Withdrawal time was at least 6 minutes to ensure adequate examination.     The patient tolerated the procedure well.  There were no immediate complications.    The quality of the bowel preparation was good.      FINDINGS:  Scope to terminal ileum.  No blood in lumen, mucosa appeared normal.  3mm cecal polyp removed with biopsy forceps  7mm polyp on ascending colon fold not removed  3mm polyp hepatic flexure not removed  Right colon diverticulosis  No blood in lumen    RECOMMENDATIONS:  Resume diet   Per giovani Patrick EGD in 4-6 weeks  May resume Eliquis and Plavix

## 2023-12-24 NOTE — PROGRESS NOTES
Hospital Medicine Daily Progress Note    Date of Service  12/24/2023    Chief Complaint  Alexa Mcclelland is a 83 y.o. male admitted 12/22/2023 with bloody stools    Hospital Course   83 y.o. male who presented 12/22/2023 with past medical history of coronary artery disease, dementia, chronic inflammatory demyelinating polyneuritis, history of A-fib, wheelchair bound, who presents to the hospital for 3 days of melena.  Patient lives in assisted living facility. Patient does take Plavix and Eliquis as an outpatient.  1 year ago he had a NSTEMI at the [facility].  Cardiac stents were not placed at the time. Last colonoscopy likely 10 years ago.   GI was consulted, s/p EGD 12/23 Hall's esophagus, Patchy gastritis isolated to the antrum and prepyloric region. Localized duodenitis  S/p colonoscopy 12/24 and noted multiple polyps.  Cecal polyp was removed    Interval Problem Update  Seen patient and wife at bedside  Discussed with GI  Status post colonoscopy today  Discussed with GI, resume diet today.  And resume Plavix and Eliquis tomorrow.  Repeat CBC on 26    I have discussed this patient's plan of care and discharge plan at IDT rounds today with Case Management, Nursing, Nursing leadership, and other members of the IDT team.    Consultants/Specialty  GI    Code Status  DNAR/DNI    Disposition  The patient is not medically cleared for discharge to home or a post-acute facility.      I have placed the appropriate orders for post-discharge needs.    Review of Systems  Review of Systems   Unable to perform ROS: Dementia        Physical Exam  Temp:  [35.8 °C (96.5 °F)-36.8 °C (98.2 °F)] 36.2 °C (97.1 °F)  Pulse:  [63-77] 75  Resp:  [14-19] 19  BP: ()/() 133/62  SpO2:  [95 %-100 %] 96 %    Physical Exam  Vitals and nursing note reviewed.   Constitutional:       Appearance: Normal appearance.   HENT:      Head: Normocephalic and atraumatic.      Mouth/Throat:      Pharynx: Oropharynx is clear.   Eyes:       Pupils: Pupils are equal, round, and reactive to light.   Neck:      Vascular: No carotid bruit.   Cardiovascular:      Rate and Rhythm: Normal rate and regular rhythm.   Pulmonary:      Effort: Pulmonary effort is normal.      Breath sounds: Normal breath sounds.   Abdominal:      General: Abdomen is flat. Bowel sounds are normal. There is no distension.      Palpations: Abdomen is soft. There is no mass.   Musculoskeletal:         General: Normal range of motion.      Cervical back: Neck supple.   Skin:     General: Skin is warm and dry.      Coloration: Skin is pale.   Neurological:      General: No focal deficit present.      Mental Status: He is alert.      Comments: Following commands   Psychiatric:      Comments: Unable to assess     Noted    Fluids    Intake/Output Summary (Last 24 hours) at 12/24/2023 1429  Last data filed at 12/24/2023 1131  Gross per 24 hour   Intake 540 ml   Output 1 ml   Net 539 ml         Laboratory  Recent Labs     12/22/23  1700 12/23/23  0400 12/23/23  1237 12/23/23 2023 12/24/23  0423 12/24/23  1217   WBC 7.4 7.0  --   --  6.9  --    RBC 2.94* 2.66*  --   --  2.73*  --    HEMOGLOBIN 8.4* 7.6*   < > 7.6* 7.7* 7.9*   HEMATOCRIT 25.5* 23.5*  --   --  24.2*  --    MCV 86.7 88.3  --   --  88.6  --    MCH 28.6 28.6  --   --  28.2  --    MCHC 32.9 32.3  --   --  31.8*  --    RDW 44.1 45.8  --   --  45.6  --    PLATELETCT 218 192  --   --  211  --    MPV 9.4 9.5  --   --  9.3  --     < > = values in this interval not displayed.       Recent Labs     12/22/23  1700 12/23/23 0400 12/24/23 0423   SODIUM 136 136 138   POTASSIUM 4.2 3.8 3.6   CHLORIDE 103 104 105   CO2 23 22 22   GLUCOSE 97 103* 96   BUN 20 18 16   CREATININE 0.80 0.83 0.82   CALCIUM 8.6 8.3* 8.1*       Recent Labs     12/22/23 1700   APTT 35.5   INR 1.17*                 Imaging  No orders to display        Assessment/Plan  * GI bleed- (present on admission)  Assessment & Plan  With hematemesis and melena, likely upper  GI source  Patient is started on IV Protonix  GI consulted, status post EGD on 12/23 Hall's esophagus, Patchy gastritis isolated to the antrum and prepyloric region. Localized duodenitis  Status post colonoscopy 12/24 with multiple polyps.  Cecal polyp was removed   Discussed with GI, will resume Eliquis and Plavix on 12/25 and monitor hemoglobin    Advance care planning  Assessment & Plan  I have discussed with patient's wife for advance care planning.  Patient has dementia.  Per his wife, the patient wishes to be DNR/DNI in the event that he has cardiac/pulmonary arrest    I discussed advance care planning with the patient's family for 16 minutes, including diagnosis, prognosis, plan of care, risks and benefits of any therapies that could be offered, as well as alternatives including palliation and hospice, as appropriate.        Dementia (HCC)  Assessment & Plan  Frequent reorientation  Continue home Seroquel    Hypertension  Assessment & Plan  Continue home medications    Coronary artery disease  Assessment & Plan  Denies chest pain  Hold Plavix  Discussed with GI, will resume Plavix and 12/25    AF (atrial fibrillation) ([facility])  Assessment & Plan  Rate controlled  Hold Eliquis  Discussed with GI, will resume Eliquis on 12/25    Acute blood loss anemia  Assessment & Plan  Monitor hemoglobin every 8 hours   Check iron profile and vitamin B12  Transfuse if Hb less than 7         VTE prophylaxis:   SCDs/TEDs      I have performed a physical exam and reviewed and updated ROS and Plan today (12/24/2023). In review of yesterday's note (12/23/2023), there are no changes except as documented above.    Patient is has a high medical complexity, complex decision making and is at high risk for complication, morbidity, and mortality.    My total time spent caring for the patient on the day of the encounter was 52 minutes.   This does not include time spent on separately billable procedures/tests.

## 2023-12-24 NOTE — CARE PLAN
The patient is Stable - Low risk of patient condition declining or worsening    Shift Goals  Clinical Goals: Monitor H&h, bowel prep for colonoscopy and remain free from falls  Patient Goals: Rest  Family Goals: No family at bedside    Progress made toward(s) clinical / shift goals:    Problem: Knowledge Deficit - Standard  Goal: Patient and family/care givers will demonstrate understanding of plan of care, disease process/condition, diagnostic tests and medications  Description: Target End Date:  1-3 days or as soon as patient condition allows    Document in Patient Education    1.  Patient and family/caregiver oriented to unit, equipment, visitation policy and means for communicating concern  2.  Complete/review Learning Assessment  3.  Assess knowledge level of disease process/condition, treatment plan, diagnostic tests and medications  4.  Explain disease process/condition, treatment plan, diagnostic tests and medications  Outcome: Progressing     Problem: Skin Integrity  Goal: Skin integrity is maintained or improved  Description: Target End Date:  Prior to discharge or change in level of care    Document interventions on Skin Risk/Zeke flowsheet groups and corresponding LDA    1.  Assess and monitor skin integrity, appearance and/or temperature  2.  Assess risk factors for impaired skin integrity and/or pressures ulcers  3.  Implement precautions to protect skin integrity in collaboration with interdisciplinary team  4.  Implement pressure ulcer prevention protocol if at risk for skin breakdown  5.  Confirm wound care consult if at risk for skin breakdown  Outcome: Progressing     Problem: Fall Risk  Goal: Patient will remain free from falls  Description: Target End Date:  Prior to discharge or change in level of care    Document interventions on the Licha Torres Fall Risk Assessment    1.  Assess for fall risk factors  2.  Implement fall precautions  Outcome: Progressing       Patient is not progressing  towards the following goals:

## 2023-12-25 LAB
ANION GAP SERPL CALC-SCNC: 12 MMOL/L (ref 7–16)
BUN SERPL-MCNC: 19 MG/DL (ref 8–22)
CALCIUM SERPL-MCNC: 8.2 MG/DL (ref 8.5–10.5)
CHLORIDE SERPL-SCNC: 105 MMOL/L (ref 96–112)
CO2 SERPL-SCNC: 19 MMOL/L (ref 20–33)
CREAT SERPL-MCNC: 0.77 MG/DL (ref 0.5–1.4)
ERYTHROCYTE [DISTWIDTH] IN BLOOD BY AUTOMATED COUNT: 46.6 FL (ref 35.9–50)
GFR SERPLBLD CREATININE-BSD FMLA CKD-EPI: 87 ML/MIN/1.73 M 2
GLUCOSE SERPL-MCNC: 109 MG/DL (ref 65–99)
HCT VFR BLD AUTO: 23.3 % (ref 42–52)
HCT VFR BLD AUTO: 23.4 % (ref 42–52)
HGB BLD-MCNC: 7.4 G/DL (ref 14–18)
HGB BLD-MCNC: 7.7 G/DL (ref 14–18)
MCH RBC QN AUTO: 28.4 PG (ref 27–33)
MCHC RBC AUTO-ENTMCNC: 31.8 G/DL (ref 32.3–36.5)
MCV RBC AUTO: 89.3 FL (ref 81.4–97.8)
PLATELET # BLD AUTO: 200 K/UL (ref 164–446)
PMV BLD AUTO: 9.3 FL (ref 9–12.9)
POTASSIUM SERPL-SCNC: 3.9 MMOL/L (ref 3.6–5.5)
RBC # BLD AUTO: 2.61 M/UL (ref 4.7–6.1)
SODIUM SERPL-SCNC: 136 MMOL/L (ref 135–145)
WBC # BLD AUTO: 7 K/UL (ref 4.8–10.8)

## 2023-12-25 PROCEDURE — 85018 HEMOGLOBIN: CPT

## 2023-12-25 PROCEDURE — 99233 SBSQ HOSP IP/OBS HIGH 50: CPT | Performed by: STUDENT IN AN ORGANIZED HEALTH CARE EDUCATION/TRAINING PROGRAM

## 2023-12-25 PROCEDURE — 85014 HEMATOCRIT: CPT

## 2023-12-25 PROCEDURE — A9270 NON-COVERED ITEM OR SERVICE: HCPCS | Performed by: STUDENT IN AN ORGANIZED HEALTH CARE EDUCATION/TRAINING PROGRAM

## 2023-12-25 PROCEDURE — 700105 HCHG RX REV CODE 258: Performed by: NURSE PRACTITIONER

## 2023-12-25 PROCEDURE — 36415 COLL VENOUS BLD VENIPUNCTURE: CPT

## 2023-12-25 PROCEDURE — 80048 BASIC METABOLIC PNL TOTAL CA: CPT

## 2023-12-25 PROCEDURE — 85027 COMPLETE CBC AUTOMATED: CPT

## 2023-12-25 PROCEDURE — 770006 HCHG ROOM/CARE - MED/SURG/GYN SEMI*

## 2023-12-25 PROCEDURE — 700111 HCHG RX REV CODE 636 W/ 250 OVERRIDE (IP): Mod: JZ | Performed by: NURSE PRACTITIONER

## 2023-12-25 PROCEDURE — 700102 HCHG RX REV CODE 250 W/ 637 OVERRIDE(OP): Performed by: STUDENT IN AN ORGANIZED HEALTH CARE EDUCATION/TRAINING PROGRAM

## 2023-12-25 PROCEDURE — 99497 ADVNCD CARE PLAN 30 MIN: CPT | Performed by: STUDENT IN AN ORGANIZED HEALTH CARE EDUCATION/TRAINING PROGRAM

## 2023-12-25 PROCEDURE — 99232 SBSQ HOSP IP/OBS MODERATE 35: CPT | Performed by: NURSE PRACTITIONER

## 2023-12-25 PROCEDURE — 700102 HCHG RX REV CODE 250 W/ 637 OVERRIDE(OP): Performed by: HOSPITALIST

## 2023-12-25 PROCEDURE — C9113 INJ PANTOPRAZOLE SODIUM, VIA: HCPCS | Performed by: HOSPITALIST

## 2023-12-25 PROCEDURE — 700111 HCHG RX REV CODE 636 W/ 250 OVERRIDE (IP): Performed by: HOSPITALIST

## 2023-12-25 PROCEDURE — A9270 NON-COVERED ITEM OR SERVICE: HCPCS | Performed by: HOSPITALIST

## 2023-12-25 RX ORDER — CLOPIDOGREL BISULFATE 75 MG/1
75 TABLET ORAL DAILY
Status: DISCONTINUED | OUTPATIENT
Start: 2023-12-25 | End: 2023-12-25

## 2023-12-25 RX ADMIN — APIXABAN 2.5 MG: 2.5 TABLET, FILM COATED ORAL at 18:35

## 2023-12-25 RX ADMIN — SODIUM CHLORIDE 250 MG: 9 INJECTION, SOLUTION INTRAVENOUS at 18:35

## 2023-12-25 RX ADMIN — TERAZOSIN HYDROCHLORIDE 4 MG: 1 CAPSULE ORAL at 20:00

## 2023-12-25 RX ADMIN — QUETIAPINE FUMARATE 25 MG: 25 TABLET ORAL at 18:33

## 2023-12-25 RX ADMIN — AMLODIPINE BESYLATE 5 MG: 5 TABLET ORAL at 18:33

## 2023-12-25 RX ADMIN — SERTRALINE 100 MG: 100 TABLET, FILM COATED ORAL at 06:23

## 2023-12-25 RX ADMIN — PANTOPRAZOLE SODIUM 40 MG: 40 INJECTION, POWDER, FOR SOLUTION INTRAVENOUS at 06:23

## 2023-12-25 RX ADMIN — APIXABAN 2.5 MG: 2.5 TABLET, FILM COATED ORAL at 08:46

## 2023-12-25 RX ADMIN — QUETIAPINE FUMARATE 25 MG: 25 TABLET ORAL at 06:23

## 2023-12-25 RX ADMIN — CLOPIDOGREL BISULFATE 75 MG: 75 TABLET ORAL at 08:47

## 2023-12-25 RX ADMIN — LEVETIRACETAM 1000 MG: 500 TABLET, FILM COATED ORAL at 18:33

## 2023-12-25 RX ADMIN — LEVETIRACETAM 1000 MG: 500 TABLET, FILM COATED ORAL at 06:23

## 2023-12-25 RX ADMIN — PANTOPRAZOLE SODIUM 40 MG: 40 INJECTION, POWDER, FOR SOLUTION INTRAVENOUS at 18:33

## 2023-12-25 ASSESSMENT — ENCOUNTER SYMPTOMS
BLURRED VISION: 0
MEMORY LOSS: 1
VOMITING: 0
WEAKNESS: 1
CONSTIPATION: 0
SHORTNESS OF BREATH: 0
BACK PAIN: 0
FEVER: 0
HEARTBURN: 0
ABDOMINAL PAIN: 0
COUGH: 0
BLOOD IN STOOL: 0
DEPRESSION: 0
NAUSEA: 0
DIARRHEA: 0
CHILLS: 0
DIZZINESS: 0

## 2023-12-25 ASSESSMENT — CHA2DS2 SCORE
DIABETES: NO
AGE 65 TO 74: NO
VASCULAR DISEASE: YES
CHF OR LEFT VENTRICULAR DYSFUNCTION: NO
SEX: MALE
PRIOR STROKE OR TIA OR THROMBOEMBOLISM: NO
HYPERTENSION: YES
AGE 75 OR GREATER: YES
CHA2DS2 VASC SCORE: 4

## 2023-12-25 NOTE — CARE PLAN
The patient is Stable - Low risk of patient condition declining or worsening    Shift Goals  Clinical Goals: monitor labs and vitals  Patient Goals: rest and comfort  Family Goals: No family at bedside    Progress made toward(s) clinical / shift goals:    Problem: Knowledge Deficit - Standard  Goal: Patient and family/care givers will demonstrate understanding of plan of care, disease process/condition, diagnostic tests and medications  Outcome: Progressing       Patient alert to self but is easily redirectable. Sitter at bedside for safety. Pleasant and cooperative, took medications. Denies any pain or discomfort, call light and personal items within reach, encouraged to use call light for assistance

## 2023-12-25 NOTE — THERAPY
Physical Therapy   Initial Evaluation     Patient Name: Alexa Mcclelland  Age:  83 y.o., Sex:  male  Medical Record #: [id number]  Today's Date: 12/24/2023     Precautions  Precautions: Fall Risk    Assessment  Patient is 83 y.o. male who presented 12/22/2023 with bloody stools.  Found to have GI bleed  Underwent EGD 12/23-found to have Hall's esophagus, sliding hiatal hernia, gastritis, duodenitis   Underwent colonoscopy 12/24-found to have cecal polyp, diverticulosis     PMH: coronary artery disease, dementia, chronic inflammatory demyelinating polyneuritis, A-fib    Patient seen for PT evaluation and treatment. Patient in bed, agreeable for the session. Patient able to participate with functional mobility tasks as detailed below. Patient is currently not at his baseline level of mobility. H/O several falls in the last few months. Will continue to benefit from PT services and currently recommend post acute placement at this time. If patient improves with his overall functional mobility and [facility] staff able to provide required level of assistance/supervision, he may be able to return back to [facility] and resume  services.     Plan    Physical Therapy Initial Treatment Plan   Treatment Plan : Bed Mobility, Neuro Re-Education / Balance, Therapeutic Activities, Therapeutic Exercise  Treatment Frequency: 4 Times per Week  Duration: Until Therapy Goals Met    DC Equipment Recommendations: Unable to determine at this time  Discharge Recommendations: Recommend post-acute placement for additional physical therapy services prior to discharge home     Objective     12/24/23 1547   Charge Group   PT Evaluation PT Evaluation Mod   Total Time Spent   PT Evaluation Time Spent (Mins) 17   PT Therapeutic Activities Time Spent (Mins) 15   PT Total Time Spent (Calculated) 32   Initial Contact Note    Initial Contact Note Order Received and Verified, Physical Therapy Evaluation in Progress with Full Report to Follow.   Precautions    Precautions Fall Risk   Vitals   O2 Delivery Device None - Room Air   Pain   Pain Scales 0 to 10 Scale    Intervention Declines   Prior Living Situation   Prior Services Continuous (24 Hour) Care Giving Per Service;Skilled Home Health Services  (HH PT-3x/week)   Housing / Facility Assisted Living Residence   Equipment Owned Other (Comments)  (Transport chair)   Lives with - Patient's Self Care Capacity Attendant / Paid Care Giver   Comments Per spouse, patient has been at this [facility] since Aug 2023   Prior Level of Functional Mobility   Bed Mobility Independent   Transfer Status Independent   Ambulation Other (Comments)  (Non ambulatory)   Wheelchair Independent   History of Falls   History of Falls Yes   Date of Last Fall   (H/O ~ 6 falls in the last few months, most of them happening  during transfers, [facility] staff finds the patient on the floor)   Cognition    Orientation Level Not Oriented to Time   Level of Consciousness Alert   Ability To Follow Commands 1 Step   Safety Awareness Impaired   New Learning Impaired   Attention Impaired   Sequencing Impaired   Initiation Impaired   Passive ROM Lower Body   Passive ROM Lower Body WDL   Active ROM Lower Body    Active ROM Lower Body  X   Comments Grossly BLE WFL   Strength Lower Body   Lower Body Strength  X   Comments Grossly BLE 3/5   Other Treatments   Other Treatments Provided Discussed DC recommendations with patient's wife, she is open to short term rehab if needed. Wife mentioned that patient would not call for assistance, he likes to do things on his own. Discussed about changing his current transport chair with a regular WC, wife mentioned that she is in the process of obtaining a new WC, unclear as to how much time it would take. Patient was assisted with doning of lower body clothes & shoes in preparation for mobility, per request from patient.   Balance Assessment   Sitting Balance (Static) Fair   Sitting Balance (Dynamic) Fair -   Standing Balance (Static)  Poor +   Standing Balance (Dynamic) Poor   Weight Shift Sitting Fair   Weight Shift Standing Poor   Comments Seated EOB; Standing for transfers only   Bed Mobility    Supine to Sit Minimal Assist   Sit to Supine Minimal Assist   Scooting Minimal Assist   Rolling Minimal Assist to Rt.;Minimum Assist to Lt.   Comments HOB flat, use of bed rail, cues for sequencing of task, hand/LE placement   Gait Analysis   Gait Level Of Assist Unable to Participate   Comments Non-ambulatory at baseline   Functional Mobility   Sit to Stand Minimal Assist   Bed, Chair, Wheelchair Transfer Minimal Assist   Transfer Method Stand Pivot   Mobility Bed-transport chair; transport chair-bed; cues for sequencing, LE placement, hand placement   Wheelchair Assist Minimal Assist   Distance Wheelchair (Feet or Distance) 60   Comments Patient able to use his LE to maneuver his transport chair in the hallway; required assistance with directional changes in narrow spaces, around furnitures. Able to perform directional changes in the hallway; able to demonstrate locking/unlocking the chair while seated in the chair; notice increased difficulty to do so while seated EOB   How much difficulty does the patient currently have...   Turning over in bed (including adjusting bedclothes, sheets and blankets)? 2   Sitting down on and standing up from a chair with arms (e.g., wheelchair, bedside commode, etc.) 1   Moving from lying on back to sitting on the side of the bed? 1   How much help from another person does the patient currently need...   Moving to and from a bed to a chair (including a wheelchair)? 3   Need to walk in a hospital room? 1   Climbing 3-5 steps with a railing? 1   6 clicks Mobility Score 9   Activity Tolerance   Sitting in Chair < 20 minutes   Patient / Family Goals    Patient / Family Goal #1 To get stronger   Short Term Goals    Short Term Goal # 1 Patient will perform supine-sit with HOB flat with supervision in 6 visits   Short Term  Goal # 2 Patient will perform WC transfers with supervision in 6 visits   Short Term Goal # 3 Patient will perform sit-stand with supervision in 6 visits   Short Term Goal # 4 Patient will maneuver around his room in his WC with supervision in 6 visits   Education Group   Education Provided Role of Physical Therapist   Role of Physical Therapist Patient Response Patient;Significant Other;Acceptance;Explanation;Verbal Demonstration   Physical Therapy Initial Treatment Plan    Treatment Plan  Bed Mobility;Neuro Re-Education / Balance;Therapeutic Activities;Therapeutic Exercise   Treatment Frequency 4 Times per Week   Duration Until Therapy Goals Met   Problem List    Problems Impaired Bed Mobility;Impaired Transfers;Functional Strength Deficit;Impaired Balance;Decreased Activity Tolerance   Anticipated Discharge Equipment and Recommendations   DC Equipment Recommendations Unable to determine at this time   Discharge Recommendations Recommend post-acute placement for additional physical therapy services prior to discharge home   Interdisciplinary Plan of Care Collaboration   IDT Collaboration with  Family / Caregiver;Nursing   Patient Position at End of Therapy In Bed;Bed Alarm On;Call Light within Reach;Tray Table within Reach;Family / Friend in Room  (R sidelying in the bed)   Session Information   Date / Session Number  12/24-1(1/4, 12/30)

## 2023-12-25 NOTE — CARE PLAN
"  Problem: Knowledge Deficit - Standard  Goal: Patient and family/care givers will demonstrate understanding of plan of care, disease process/condition, diagnostic tests and medications  Outcome: Progressing     Problem: Skin Integrity  Goal: Skin integrity is maintained or improved  Outcome: Progressing     Problem: Fall Risk  Goal: Patient will remain free from falls  Outcome: Progressing   The patient is Stable - Low risk of patient condition declining or worsening    Shift Goals  Clinical Goals: Monitor H&H  Patient Goals: \"To get some rest\"  Family Goals: No family at bedside    Pt became irritable at approximately 1730. His wife mentioned that \"he usually starts becoming more aggressive.\" Pt was transitioned from tele sitter to 1:1. Pt in bed, with call light and sitter at bedside. Will continue to monitor.        "

## 2023-12-25 NOTE — PROGRESS NOTES
..Gastroenterology Progress Note               Author:  MICHELLE Griffith Date & Time Created: 12/25/2023 7:41 AM       Patient ID:  Name:             Alexa Mcclelland  YOB: 1940  Age:                 83 y.o.  male  MRN:               [id number]        Medical Decision Making, by Problem:  Active Hospital Problems    Diagnosis     Advance care planning [Z71.89]     GI bleed [K92.2]     Acute blood loss anemia [D62]     AF (atrial fibrillation) (HCC) [I48.91]     Coronary artery disease [I25.10]     Hypertension [I10]     Dementia (HCC) [F03.90]            Presenting Chief Complaint:  Melena    HPI:  83 year old male with medical history of Afib, coronary artery disease ,NSTEMI at the [facility] about a year ago, on Plavix and Eliquis who presented to inpatient GI service for melena for 3 days. Hgb 8.4 on admission.    Interval History:  12/25/2023:  2 black BM overnight  VSS   Hgb 7.4    12/24/2023: Colonoscopy with Dr. Salazar:  FINDINGS:  Scope to terminal ileum.  No blood in lumen, mucosa appeared normal.  3mm cecal polyp removed with biopsy forceps  7mm polyp on ascending colon fold not removed  3mm polyp hepatic flexure not removed  Right colon diverticulosis  No blood in lumen    12/23/2023: EGD with Dr Anguiano  IMPRESSION:  Hall's esophagus, 3 cm.  Not biopsied given direct oral anticoagulant and antiplatelet use.  Sliding-type hiatal hernia.  Patchy gastritis isolated to the antrum and prepyloric region.  Localized duodenitis at the D1-D2 transition.        Hospital Medications:  Current Facility-Administered Medications   Medication Dose Frequency Provider Last Rate Last Admin    calcium carbonate (Tums) chewable tab 500 mg  500 mg TID PRN VANESA JoycePYahir        acetaminophen (Tylenol) tablet 650 mg  650 mg Q6HRS PRN Jer Fung M.D.        ondansetron (Zofran) syringe/vial injection 4 mg  4 mg Q4HRS PRN Jer Fung M.D.   4 mg at 12/24/23 9224    ondansetron  "(Zofran ODT) dispertab 4 mg  4 mg Q4HRS PRN Jer Fung M.D.        amLODIPine (Norvasc) tablet 5 mg  5 mg BID TIMMY DiazDYahir   5 mg at 12/24/23 1720    levETIRAcetam (Keppra) tablet 1,000 mg  1,000 mg BID TIMMY DiazDYahir   1,000 mg at 12/25/23 0623    metoprolol SR (Toprol XL) tablet 25 mg  25 mg DAILY RADHA Diaz.D.   25 mg at 12/23/23 0630    QUEtiapine (SEROquel) tablet 25 mg  25 mg BID TIMMY DiazDYahir   25 mg at 12/25/23 0623    sertraline (Zoloft) tablet 100 mg  100 mg DAILY RADHA Diaz.DYahir   100 mg at 12/25/23 0623    terazosin (Hytrin) capsule 4 mg  4 mg Nightly TIMMY DiazDYahir   4 mg at 12/24/23 1947    pantoprazole (Protonix) injection 40 mg  40 mg BID RADHA Diaz.DYahir   40 mg at 12/25/23 0623   Last reviewed on 12/24/2023  8:03 AM by Nikki Obrien R.N.       Review of Systems:  Review of Systems   Constitutional:  Negative for chills, fever and malaise/fatigue.   HENT:  Negative for hearing loss.    Eyes:  Negative for blurred vision.   Respiratory:  Negative for cough and shortness of breath.    Cardiovascular:  Negative for chest pain and leg swelling.   Gastrointestinal:  Negative for abdominal pain, blood in stool, constipation, diarrhea, heartburn, melena, nausea and vomiting.   Genitourinary:  Negative for dysuria.   Musculoskeletal:  Negative for back pain.   Skin:  Negative for rash.   Neurological:  Positive for weakness. Negative for dizziness.   Psychiatric/Behavioral:  Positive for memory loss. Negative for depression.    All other systems reviewed and are negative.        Vital signs:  Weight/BMI: Body mass index is 23 kg/m².  /48   Pulse 77   Temp 36.8 °C (98.2 °F) (Temporal)   Resp 18   Ht 1.803 m (5' 11\")   Wt 74.8 kg (164 lb 14.5 oz)   SpO2 100%   Vitals:    12/24/23 1030 12/24/23 1617 12/24/23 1926 12/25/23 0600   BP: 133/62 107/51 130/50 107/48   Pulse: 75 70 77    Resp: 19 18 18    Temp:  36.8 °C (98.2 °F) 36.8 °C (98.2 °F)    TempSrc:  " Temporal Temporal    SpO2: 96% 96% 100%    Weight:       Height:         Oxygen Therapy:  Pulse Oximetry: 100 %, O2 (LPM): 10, O2 Delivery Device: None - Room Air    Intake/Output Summary (Last 24 hours) at 12/25/2023 0741  Last data filed at 12/24/2023 1547  Gross per 24 hour   Intake 660 ml   Output 1 ml   Net 659 ml         Physical Exam:  Physical Exam  Vitals and nursing note reviewed.   Constitutional:       General: He is not in acute distress.     Appearance: Normal appearance. He is not ill-appearing.   HENT:      Head: Normocephalic and atraumatic.      Right Ear: External ear normal.      Left Ear: External ear normal.      Nose: Nose normal.      Mouth/Throat:      Mouth: Mucous membranes are moist.      Pharynx: Oropharynx is clear.   Eyes:      General: No scleral icterus.  Cardiovascular:      Rate and Rhythm: Normal rate and regular rhythm.      Pulses: Normal pulses.      Heart sounds: Normal heart sounds.   Pulmonary:      Effort: Pulmonary effort is normal.      Breath sounds: Normal breath sounds.   Abdominal:      General: Abdomen is flat. Bowel sounds are normal. There is no distension.      Palpations: Abdomen is soft.   Musculoskeletal:         General: Normal range of motion.      Cervical back: Normal range of motion and neck supple.   Skin:     General: Skin is warm.      Capillary Refill: Capillary refill takes less than 2 seconds.      Coloration: Skin is pale.   Neurological:      Mental Status: He is alert. Mental status is at baseline.   Psychiatric:         Mood and Affect: Mood normal.         Behavior: Behavior normal.             Labs:  Recent Labs     12/23/23  0400 12/24/23  0423 12/25/23  0124   SODIUM 136 138 136   POTASSIUM 3.8 3.6 3.9   CHLORIDE 104 105 105   CO2 22 22 19*   BUN 18 16 19   CREATININE 0.83 0.82 0.77   CALCIUM 8.3* 8.1* 8.2*     Recent Labs     12/22/23  1700 12/23/23  0400 12/24/23  0423 12/25/23  0124   ALTSGPT 23 19  --   --    ASTSGOT 29 27  --   --     ALKPHOSPHAT 77 70  --   --    TBILIRUBIN 0.3 0.4  --   --    LIPASE 66  --   --   --    GLUCOSE 97 103* 96 109*     Recent Labs     12/22/23  1700 12/23/23  0400 12/24/23  0423 12/25/23  0124   WBC 7.4 7.0 6.9 7.0   NEUTSPOLYS 63.30 66.60  --   --    LYMPHOCYTES 28.00 25.20  --   --    MONOCYTES 6.80 6.10  --   --    EOSINOPHILS 0.70 0.90  --   --    BASOPHILS 0.40 0.30  --   --    ASTSGOT 29 27  --   --    ALTSGPT 23 19  --   --    ALKPHOSPHAT 77 70  --   --    TBILIRUBIN 0.3 0.4  --   --      Recent Labs     12/22/23  1700 12/23/23 0400 12/23/23  1237 12/24/23 0423 12/24/23  1217 12/25/23  0124   RBC 2.94* 2.66*  --  2.73*  --  2.61*   HEMOGLOBIN 8.4* 7.6*   < > 7.7* 7.9* 7.4*   HEMATOCRIT 25.5* 23.5*  --  24.2*  --  23.3*   PLATELETCT 218 192  --  211  --  200   PROTHROMBTM 15.0*  --   --   --   --   --    APTT 35.5  --   --   --   --   --    INR 1.17*  --   --   --   --   --    IRON  --  36*  --   --   --   --    FERRITIN  --  41.1  --   --   --   --    TOTIRONBC  --  292  --   --   --   --     < > = values in this interval not displayed.     Recent Results (from the past 24 hour(s))   HGB (Hemoglobin) for 48 hours    Collection Time: 12/24/23 12:17 PM   Result Value Ref Range    Hemoglobin 7.9 (L) 14.0 - 18.0 g/dL   CBC WITHOUT DIFFERENTIAL    Collection Time: 12/25/23  1:24 AM   Result Value Ref Range    WBC 7.0 4.8 - 10.8 K/uL    RBC 2.61 (L) 4.70 - 6.10 M/uL    Hemoglobin 7.4 (L) 14.0 - 18.0 g/dL    Hematocrit 23.3 (L) 42.0 - 52.0 %    MCV 89.3 81.4 - 97.8 fL    MCH 28.4 27.0 - 33.0 pg    MCHC 31.8 (L) 32.3 - 36.5 g/dL    RDW 46.6 35.9 - 50.0 fL    Platelet Count 200 164 - 446 K/uL    MPV 9.3 9.0 - 12.9 fL   Basic Metabolic Panel    Collection Time: 12/25/23  1:24 AM   Result Value Ref Range    Sodium 136 135 - 145 mmol/L    Potassium 3.9 3.6 - 5.5 mmol/L    Chloride 105 96 - 112 mmol/L    Co2 19 (L) 20 - 33 mmol/L    Glucose 109 (H) 65 - 99 mg/dL    Bun 19 8 - 22 mg/dL    Creatinine 0.77 0.50 - 1.40  mg/dL    Calcium 8.2 (L) 8.5 - 10.5 mg/dL    Anion Gap 12.0 7.0 - 16.0   ESTIMATED GFR    Collection Time: 12/25/23  1:24 AM   Result Value Ref Range    GFR (CKD-EPI) 87 >60 mL/min/1.73 m 2       Radiology Review:  No orders to display         MDM (Data Review):   -Records reviewed and summarized in current documentation  -I personally reviewed and interpreted the laboratory results  -I personally reviewed the radiology images    Assessment/Recommendations:  Assessment:  Melena  Acute blood loss anemia  Atrial fibrillation on Eliquis  CAD on Plavix  NSTEMI 1 year ago    RECOMMENDATIONS:  --- Trend H&H and transfuse for hemoglobin less than 7  ---I had an extensive discussion with patient and family at bedside regarding both DAPT and anticoagulation, given his risk of continued bleeding they wish to discontinue Plavix and continue Eliquis at this time  ---PPI therapy  ---Recommended relook upper endoscopy in 4 to 6 weeks.  Needs to be off antiplatelet x 5 days and DOAC x 48 hours prior to considering biopsies.  ---Family will ensure [facility]    GI team will follow patient in the a.m.    Discussed with patient and his family, Dr. Sapp, Dr. Salazar    Core Quality Measures   Reviewed items::  Labs, Medications and Radiology reports reviewed

## 2023-12-26 VITALS
TEMPERATURE: 98.1 F | HEIGHT: 71 IN | OXYGEN SATURATION: 96 % | HEART RATE: 75 BPM | SYSTOLIC BLOOD PRESSURE: 107 MMHG | WEIGHT: 164.9 LBS | RESPIRATION RATE: 16 BRPM | BODY MASS INDEX: 23.09 KG/M2 | DIASTOLIC BLOOD PRESSURE: 57 MMHG

## 2023-12-26 LAB
ANION GAP SERPL CALC-SCNC: 10 MMOL/L (ref 7–16)
BUN SERPL-MCNC: 16 MG/DL (ref 8–22)
CALCIUM SERPL-MCNC: 8.2 MG/DL (ref 8.5–10.5)
CHLORIDE SERPL-SCNC: 105 MMOL/L (ref 96–112)
CO2 SERPL-SCNC: 22 MMOL/L (ref 20–33)
CREAT SERPL-MCNC: 0.86 MG/DL (ref 0.5–1.4)
ERYTHROCYTE [DISTWIDTH] IN BLOOD BY AUTOMATED COUNT: 47.8 FL (ref 35.9–50)
GFR SERPLBLD CREATININE-BSD FMLA CKD-EPI: 85 ML/MIN/1.73 M 2
GLUCOSE SERPL-MCNC: 98 MG/DL (ref 65–99)
HCT VFR BLD AUTO: 24.2 % (ref 42–52)
HGB BLD-MCNC: 7.6 G/DL (ref 14–18)
MCH RBC QN AUTO: 28 PG (ref 27–33)
MCHC RBC AUTO-ENTMCNC: 31.4 G/DL (ref 32.3–36.5)
MCV RBC AUTO: 89.3 FL (ref 81.4–97.8)
PATHOLOGY CONSULT NOTE: NORMAL
PLATELET # BLD AUTO: 217 K/UL (ref 164–446)
PMV BLD AUTO: 9.4 FL (ref 9–12.9)
POTASSIUM SERPL-SCNC: 4 MMOL/L (ref 3.6–5.5)
RBC # BLD AUTO: 2.71 M/UL (ref 4.7–6.1)
SODIUM SERPL-SCNC: 137 MMOL/L (ref 135–145)
WBC # BLD AUTO: 6.4 K/UL (ref 4.8–10.8)

## 2023-12-26 PROCEDURE — 85027 COMPLETE CBC AUTOMATED: CPT

## 2023-12-26 PROCEDURE — 700102 HCHG RX REV CODE 250 W/ 637 OVERRIDE(OP): Performed by: HOSPITALIST

## 2023-12-26 PROCEDURE — 700111 HCHG RX REV CODE 636 W/ 250 OVERRIDE (IP): Performed by: HOSPITALIST

## 2023-12-26 PROCEDURE — 700111 HCHG RX REV CODE 636 W/ 250 OVERRIDE (IP): Mod: JZ | Performed by: NURSE PRACTITIONER

## 2023-12-26 PROCEDURE — A9270 NON-COVERED ITEM OR SERVICE: HCPCS | Performed by: HOSPITALIST

## 2023-12-26 PROCEDURE — 700102 HCHG RX REV CODE 250 W/ 637 OVERRIDE(OP): Performed by: STUDENT IN AN ORGANIZED HEALTH CARE EDUCATION/TRAINING PROGRAM

## 2023-12-26 PROCEDURE — 80048 BASIC METABOLIC PNL TOTAL CA: CPT

## 2023-12-26 PROCEDURE — C9113 INJ PANTOPRAZOLE SODIUM, VIA: HCPCS | Performed by: HOSPITALIST

## 2023-12-26 PROCEDURE — 99239 HOSP IP/OBS DSCHRG MGMT >30: CPT | Performed by: HOSPITALIST

## 2023-12-26 PROCEDURE — A9270 NON-COVERED ITEM OR SERVICE: HCPCS | Performed by: STUDENT IN AN ORGANIZED HEALTH CARE EDUCATION/TRAINING PROGRAM

## 2023-12-26 PROCEDURE — 700105 HCHG RX REV CODE 258: Performed by: NURSE PRACTITIONER

## 2023-12-26 PROCEDURE — 36415 COLL VENOUS BLD VENIPUNCTURE: CPT

## 2023-12-26 PROCEDURE — 99232 SBSQ HOSP IP/OBS MODERATE 35: CPT | Performed by: NURSE PRACTITIONER

## 2023-12-26 RX ORDER — OMEPRAZOLE 20 MG/1
20 CAPSULE, DELAYED RELEASE ORAL 2 TIMES DAILY
Qty: 60 CAPSULE | Refills: 2 | Status: SHIPPED
Start: 2023-12-26

## 2023-12-26 RX ADMIN — SODIUM CHLORIDE 250 MG: 9 INJECTION, SOLUTION INTRAVENOUS at 04:53

## 2023-12-26 RX ADMIN — PANTOPRAZOLE SODIUM 40 MG: 40 INJECTION, POWDER, FOR SOLUTION INTRAVENOUS at 04:48

## 2023-12-26 RX ADMIN — QUETIAPINE FUMARATE 25 MG: 25 TABLET ORAL at 04:48

## 2023-12-26 RX ADMIN — APIXABAN 2.5 MG: 2.5 TABLET, FILM COATED ORAL at 04:48

## 2023-12-26 RX ADMIN — METOPROLOL SUCCINATE 25 MG: 25 TABLET, EXTENDED RELEASE ORAL at 04:48

## 2023-12-26 RX ADMIN — SERTRALINE 100 MG: 100 TABLET, FILM COATED ORAL at 04:48

## 2023-12-26 RX ADMIN — AMLODIPINE BESYLATE 5 MG: 5 TABLET ORAL at 04:48

## 2023-12-26 RX ADMIN — LEVETIRACETAM 1000 MG: 500 TABLET, FILM COATED ORAL at 04:48

## 2023-12-26 ASSESSMENT — ENCOUNTER SYMPTOMS
CHILLS: 0
FEVER: 0
BLOOD IN STOOL: 0
MYALGIAS: 0
DIARRHEA: 0
BACK PAIN: 0
NAUSEA: 0
WEAKNESS: 1
SORE THROAT: 0
HEARTBURN: 0
FLANK PAIN: 0
DIZZINESS: 0
DEPRESSION: 0
ABDOMINAL PAIN: 0
VOMITING: 0
CONSTIPATION: 0
COUGH: 0
SHORTNESS OF BREATH: 0
MEMORY LOSS: 1

## 2023-12-26 NOTE — PROGRESS NOTES
Hospital Medicine Daily Progress Note    Date of Service  12/25/2023    Chief Complaint  Alexa Mcclelland is a 83 y.o. male admitted 12/22/2023 with bloody stools    Hospital Course   83 y.o. male who presented 12/22/2023 with past medical history of coronary artery disease, dementia, chronic inflammatory demyelinating polyneuritis, history of A-fib, wheelchair bound, who presents to the hospital for 3 days of melena.  Patient lives in assisted living facility. Patient does take Plavix and Eliquis as an outpatient.  1 year ago he had a NSTEMI at the [facility].  Cardiac stents were not placed at the time. Last colonoscopy likely 10 years ago.   GI was consulted, s/p EGD 12/23 Hall's esophagus, Patchy gastritis isolated to the antrum and prepyloric region. Localized duodenitis  S/p colonoscopy 12/24 and noted multiple polyps.  Cecal polyp was removed  GI recommended to discontinue Plavix.    Interval Problem Update  Seen patient and wife at bedside  Discussed with GI  GI recommended to discontinue Plavix.  Resumed Eliquis today.  Continue monitoring H&H  I have discussed with the wife at bedside    I have discussed this patient's plan of care and discharge plan at IDT rounds today with Case Management, Nursing, Nursing leadership, and other members of the IDT team.    Consultants/Specialty  GI    Code Status  DNAR/DNI    Disposition  The patient is not medically cleared for discharge to home or a post-acute facility.      I have placed the appropriate orders for post-discharge needs.    Review of Systems  Review of Systems   Unable to perform ROS: Dementia        Physical Exam  Temp:  [36 °C (96.8 °F)-36.8 °C (98.2 °F)] 36 °C (96.8 °F)  Pulse:  [] 78  Resp:  [16-19] 16  BP: (107-130)/(39-51) 111/44  SpO2:  [94 %-100 %] 97 %    Physical Exam  Vitals and nursing note reviewed.   Constitutional:       Appearance: Normal appearance.   HENT:      Head: Normocephalic and atraumatic.      Mouth/Throat:       Pharynx: Oropharynx is clear.   Eyes:      Pupils: Pupils are equal, round, and reactive to light.   Neck:      Vascular: No carotid bruit.   Cardiovascular:      Rate and Rhythm: Normal rate and regular rhythm.   Pulmonary:      Effort: Pulmonary effort is normal.      Breath sounds: Normal breath sounds.   Abdominal:      General: Abdomen is flat. Bowel sounds are normal. There is no distension.      Palpations: Abdomen is soft. There is no mass.   Musculoskeletal:         General: Normal range of motion.      Cervical back: Neck supple.   Skin:     General: Skin is warm and dry.      Coloration: Skin is pale.   Neurological:      General: No focal deficit present.      Mental Status: He is alert.      Comments: Following commands   Psychiatric:      Comments: Unable to assess     Noted    Fluids    Intake/Output Summary (Last 24 hours) at 12/25/2023 1611  Last data filed at 12/25/2023 1400  Gross per 24 hour   Intake 600 ml   Output --   Net 600 ml         Laboratory  Recent Labs     12/23/23  0400 12/23/23  1237 12/24/23  0423 12/24/23  1217 12/25/23  0124   WBC 7.0  --  6.9  --  7.0   RBC 2.66*  --  2.73*  --  2.61*   HEMOGLOBIN 7.6*   < > 7.7* 7.9* 7.4*   HEMATOCRIT 23.5*  --  24.2*  --  23.3*   MCV 88.3  --  88.6  --  89.3   MCH 28.6  --  28.2  --  28.4   MCHC 32.3  --  31.8*  --  31.8*   RDW 45.8  --  45.6  --  46.6   PLATELETCT 192  --  211  --  200   MPV 9.5  --  9.3  --  9.3    < > = values in this interval not displayed.       Recent Labs     12/23/23  0400 12/24/23  0423 12/25/23  0124   SODIUM 136 138 136   POTASSIUM 3.8 3.6 3.9   CHLORIDE 104 105 105   CO2 22 22 19*   GLUCOSE 103* 96 109*   BUN 18 16 19   CREATININE 0.83 0.82 0.77   CALCIUM 8.3* 8.1* 8.2*       Recent Labs     12/22/23  1700   APTT 35.5   INR 1.17*                 Imaging  No orders to display        Assessment/Plan  * GI bleed- (present on admission)  Assessment & Plan  With hematemesis and melena, likely upper GI source  Patient is  started on IV Protonix  GI consulted, status post EGD on 12/23 Hall's esophagus, Patchy gastritis isolated to the antrum and prepyloric region. Localized duodenitis  Status post colonoscopy 12/24 with multiple polyps.  Cecal polyp was removed   Discussed with GI, will resume Eliquis on 12/25.  Discontinue Plavix  Monitoring H&H     Advance care planning  Assessment & Plan  I have discussed with patient's wife for advance care planning.  Patient has dementia.  Per his wife, the patient wishes to be DNR/DNI in the event that he has cardiac/pulmonary arrest    I discussed advance care planning with the patient's family for 16 minutes, including diagnosis, prognosis, plan of care, risks and benefits of any therapies that could be offered, as well as alternatives including palliation and hospice, as appropriate.        Dementia (HCC)  Assessment & Plan  Frequent reorientation  Continue home Seroquel    Hypertension  Assessment & Plan  Continue home medications    Coronary artery disease  Assessment & Plan  Denies chest pain  Hold Plavix  Discussed with GI and wife, agreed to discontinue Plavix.  Plavix discontinued    AF (atrial fibrillation) ([facility])  Assessment & Plan  Rate controlled  Hold Eliquis  Discussed with GI, will resume Eliquis on 12/25 12/25: Eliquis resumed    Acute blood loss anemia  Assessment & Plan  Monitor hemoglobin every 8 hours   Check iron profile and vitamin B12  Transfuse if Hb less than 7  Iron profile c/w ISA, on iv iron placement         VTE prophylaxis:   SCDs/TEDs      I have performed a physical exam and reviewed and updated ROS and Plan today (12/25/2023). In review of yesterday's note (12/24/2023), there are no changes except as documented above.    Patient is has a high medical complexity, complex decision making and is at high risk for complication, morbidity, and mortality.    My total time spent caring for the patient on the day of the encounter was 53 minutes.   This does not  include time spent on separately billable procedures/tests.

## 2023-12-26 NOTE — DISCHARGE PLANNING
Case Management Discharge Planning    Admission Date: 12/22/2023  GMLOS: 3  ALOS: 4    6-Clicks ADL Score: 12  6-Clicks Mobility Score: 9  PT and/or OT Eval ordered: Yes  Post-acute Referrals Ordered: Yes  Post-acute Choice Obtained: Yes  Has referral(s) been sent to post-acute provider:  Yes      Anticipated Discharge Dispo:    SNF blanket referral    DME Needed: none    Action(s) Taken: Talked to wife by phone. She gave choice for a SNF blanket referral. Also, explained that MD intends to discharge as soon an SNF is found. Discussed IMM.     Escalations Completed: n/a    Medically Clear: yes    Next Steps: follow up with SNF    Barriers to Discharge: pending SNF acceptance    Is the patient up for discharge tomorrow: today. (Hopefully)

## 2023-12-26 NOTE — DISCHARGE PLANNING
DC Transport Scheduled    Received request at: 12/26/2023 at 0920    Transport Company Scheduled:  [employer]  Spoke with Yissel at [facility] to schedule transport.    Scheduled Date: 12/26/2023  Scheduled Time: 1330    Destination: [facility] at [address] [place] [address]     Notified care team of scheduled transport via [employer].     If there are any changes needed to the DC transportation scheduled, please contact [facility] at ext. [phone] between the hours of 8583-5460 Mon-Fri. If outside those hours, contact the ED Case Manager at ext. [phone].

## 2023-12-26 NOTE — DISCHARGE SUMMARY
Discharge Summary    CHIEF COMPLAINT ON ADMISSION  Chief Complaint   Patient presents with    Bloody Stools     BIB EMS from Assisted living facility. Per staff pt experience one bout of dark stool two days ago. Today the doctor requested pt be brought to ED for follow up evaluation. Pt currently denies any complaints.           Reason for Admission  EMS     Admission Date  12/22/2023    CODE STATUS  DNAR/DNI    HPI & HOSPITAL COURSE  This is a 83 y.o. male here with melana  MR. Mcclelland is a 83 y.o. male who presented 12/22/2023 with past medical history of coronary artery disease on Plavix, dementia, chronic inflammatory demyelinating polyneuritis, history of A-fib, wheelchair bound, prior DVT on Eliquis, who presents to the hospital for 3 days of melena.  Patient lives in assisted living facility. Patient does take Plavix and Eliquis as an outpatient.  1 year ago he had a NSTEMI at the [facility].  Cardiac stents were not placed at the time. Last colonoscopy likely 10 years ago.   GI was consulted, s/p EGD 12/23 Hall's esophagus, Patchy gastritis isolated to the antrum and prepyloric region. Localized duodenitis  S/p colonoscopy 12/24 and noted multiple polyps.  Cecal polyp was removed.  His home Eliquis was restarted though plavix held per GI's recommendations.     12/26: I met with patient as well as his wife who is at bedside.  She states he has been living at a memory care at [facility].  Given his recent hospitalization at is felt that he would be best served with a short stay in a skilled nursing facility prior to going back to his memory care.  We discussed that GI recommends outpatient endoscopy in 4 to 6 weeks and if he were to do so he would need to be off Eliquis for 2 days prior.  We discussed that given his advancing dementia and frail medical condition that perhaps we should simply monitor him and if he has melena or any other problems then stop the Eliquis and arrange for GI follow-up.  In the  meantime given his frail condition it may be more prudent to simply monitor him.  He will be discharged on twice daily PPI indefinitely and is not to take NSAIDs, aspirin, nor Plavix.    Therefore, he is discharged in good and stable condition to SNF with close outpatient follow-up.    The patient met 2-midnight criteria for an inpatient stay at the time of discharge.    Discharge Date  12/26    FOLLOW UP ITEMS POST DISCHARGE  Possible GI follow-up as noted above depending on his situation.  If he develops melena then stop the Eliquis.    DISCHARGE DIAGNOSES  Principal Problem:    GI bleed (POA: Yes)  Active Problems:    Acute blood loss anemia (POA: Unknown)    AF (atrial fibrillation) (HCC) (POA: Unknown)    Coronary artery disease (POA: Unknown)    Hypertension (POA: Unknown)    Dementia (HCC) (POA: Unknown)    Advance care planning (POA: Unknown)  Resolved Problems:    * No resolved hospital problems. *      FOLLOW UP  No future appointments.  No follow-up provider specified.    MEDICATIONS ON DISCHARGE     Medication List        START taking these medications        Instructions   omeprazole 20 MG delayed-release capsule  Commonly known as: PriLOSEC   Take 1 Capsule by mouth 2 times a day.  Dose: 20 mg            CONTINUE taking these medications        Instructions   amLODIPine 5 MG Tabs  Commonly known as: Norvasc   Take 5 mg by mouth 2 times a day. 0800 2000  Dose: 5 mg     Eliquis 5mg Tabs  Generic drug: apixaban   Take 2.5 mg by mouth 2 times a day. .5 tab = 2.5 mg  Dose: 2.5 mg     galantamine 16 MG ER capsule  Commonly known as: Razadyne ER   Take 16 mg by mouth every morning with breakfast. 0800  Dose: 16 mg     GenTeal Tears 0.1-0.2-0.3 % Soln   Administer 1 Drop into affected eye(s) 4 times a day as needed. Indications: Excessive Cornea and Conjunctiva Dryness  Dose: 1 Drop     hydrOXYzine HCl 10 MG Tabs  Commonly known as: Atarax   Take 10 mg by mouth every 6 hours. * scheduled*     0600     1200      1800     0000  Dose: 10 mg     levetiracetam 1000 MG tablet  Commonly known as: Keppra   Take 1,000 mg by mouth 2 times a day. 0800  1900  Dose: 1,000 mg     metoprolol SR 25 MG Tb24  Commonly known as: Toprol XL   Take 25 mg by mouth every day. 0800  Dose: 25 mg     mycophenolate 500 MG tablet  Commonly known as: Cellcept   Take 500 mg by mouth 2 times a day. 0800  1900  Dose: 500 mg     * QUEtiapine 25 MG Tabs  Commonly known as: SEROquel   Take 25 mg by mouth 2 times a day. 0800  1900  Dose: 25 mg     * QUEtiapine 25 MG Tabs  Commonly known as: SEROquel   Take 25 mg by mouth 1 time a day as needed. * may take an extra dose if needed  Indications: Agitation  Dose: 25 mg     rosuvastatin 40 MG tablet  Commonly known as: Crestor   Take 40 mg by mouth every day. 0800  Dose: 40 mg     sertraline 100 MG Tabs  Commonly known as: Zoloft   Take 100 mg by mouth every day.  Dose: 100 mg     terazosin 2 MG Caps  Commonly known as: Hytrin   Take 4 mg by mouth every evening. 2 mg capsules x 2 = 4 mg  Dose: 4 mg           * This list has 2 medication(s) that are the same as other medications prescribed for you. Read the directions carefully, and ask your doctor or other care provider to review them with you.                STOP taking these medications      clopidogrel 75 MG Tabs  Commonly known as: Plavix              Allergies  Allergies   Allergen Reactions    Hydrocodone Unspecified     Allergy to Vicodin    Neosporin [Bacitracin-Polymyxin B] Unspecified     Patient unaware of reaction       DIET  Orders Placed This Encounter   Procedures    Diet Order Diet: Cardiac     Standing Status:   Standing     Number of Occurrences:   1     Order Specific Question:   Diet:     Answer:   Cardiac [6]       ACTIVITY  As tolerated.      CONSULTATIONS  GI    PROCEDURES  EGD:  IMPRESSION:  Hall's esophagus, 3 cm.  Not biopsied given direct oral anticoagulant and antiplatelet use.  Sliding-type hiatal hernia.  Patchy gastritis isolated  to the antrum and prepyloric region.  Localized duodenitis at the D1-D2 transition.     RECOMMENDATIONS:  PPI therapy  Consider relook upper endoscopy in 4 to 6 weeks.  Needs to be off antiplatelet x 5 days and DOAC x 48 hours prior to considering biopsies.  Proceed with colonoscopy tomorrow given melena and anemia.  Bowel preparation this evening.  N.p.o. after midnight.    Colonoscopy:  FINDINGS:  Scope to terminal ileum.  No blood in lumen, mucosa appeared normal.  3mm cecal polyp removed with biopsy forceps  7mm polyp on ascending colon fold not removed  3mm polyp hepatic flexure not removed  Right colon diverticulosis  No blood in lumen     RECOMMENDATIONS:  Resume diet   Per Dr. Anguiano, relook EGD in 4-6 weeks  May resume Eliquis and Plavix    LABORATORY  Lab Results   Component Value Date    SODIUM 137 12/26/2023    POTASSIUM 4.0 12/26/2023    CHLORIDE 105 12/26/2023    CO2 22 12/26/2023    GLUCOSE 98 12/26/2023    BUN 16 12/26/2023    CREATININE 0.86 12/26/2023        Lab Results   Component Value Date    WBC 6.4 12/26/2023    HEMOGLOBIN 7.6 (L) 12/26/2023    HEMATOCRIT 24.2 (L) 12/26/2023    PLATELETCT 217 12/26/2023      No orders to display         Total time of the discharge process exceeds 34 minutes.

## 2023-12-26 NOTE — DISCHARGE PLANNING
note:  Notified Lilliana at [facility] about ETA of [facility].   Notified wife. Pt signed Cobra.

## 2023-12-26 NOTE — DISCHARGE PLANNING
note:  Rideline form completed and faxed to [facility].   Message sent to MD regarding dc to [facility].

## 2023-12-26 NOTE — CARE PLAN
The patient is Stable - Low risk of patient condition declining or worsening    Shift Goals  Clinical Goals: monitor labs and vitals  Patient Goals: rest and comfort  Family Goals: No family at bedside    Progress made toward(s) clinical / shift goals:    Problem: Knowledge Deficit - Standard  Goal: Patient and family/care givers will demonstrate understanding of plan of care, disease process/condition, diagnostic tests and medications  Outcome: Progressing     Problem: Skin Integrity  Goal: Skin integrity is maintained or improved  Outcome: Progressing       Patient oriented to self this pm - easily directable, pleasant and cooperative. Bed alarm on, room near nurses' station and telesitter at bedside. Call light and personal items with in reach

## 2023-12-26 NOTE — PROGRESS NOTES
..Gastroenterology Progress Note               Author:  MICHELLE Mcgovern   Date & Time Created: 12/26/2023 11:30 AM       Patient ID:  Name:             Alexa Mcclelland  YOB: 1940  Age:                 83 y.o.  male  MRN:               [id number]        Medical Decision Making, by Problem:  Active Hospital Problems    Diagnosis     Advance care planning [Z71.89]     GI bleed [K92.2]     Acute blood loss anemia [D62]     AF (atrial fibrillation) (HCC) [I48.91]     Coronary artery disease [I25.10]     Hypertension [I10]     Dementia (HCC) [F03.90]            Presenting Chief Complaint:  Melena    HPI:  83 year old male with medical history of Afib, coronary artery disease ,NSTEMI at the [facility] about a year ago, on Plavix and Eliquis who presented to inpatient GI service for melena for 3 days. Hgb 8.4 on admission.    Interval History:  12/26/2023: Black BM reported yesterday evening.  Hemoglobin remained stable 7.4-7.7-7.6.  BUN normal at 16.  Patient reports feeling well without nausea, vomiting, abdominal pain.  Vital stable.      12/25/2023:  2 black BM overnight  VSS   Hgb 7.4    12/24/2023: Colonoscopy with Dr. Salazar:  FINDINGS:  Scope to terminal ileum.  No blood in lumen, mucosa appeared normal.  3mm cecal polyp removed with biopsy forceps  7mm polyp on ascending colon fold not removed  3mm polyp hepatic flexure not removed  Right colon diverticulosis  No blood in lumen    12/23/2023: EGD with Dr Anguiano  IMPRESSION:  Hall's esophagus, 3 cm.  Not biopsied given direct oral anticoagulant and antiplatelet use.  Sliding-type hiatal hernia.  Patchy gastritis isolated to the antrum and prepyloric region.  Localized duodenitis at the D1-D2 transition.        Hospital Medications:  Current Facility-Administered Medications   Medication Dose Frequency Provider Last Rate Last Admin    apixaban (Eliquis) tablet 2.5 mg  2.5 mg BID Keiko Sapp M.D.   2.5 mg at 12/26/23 0448    ferric  gluconate complex (Ferrlecit) 250 mg in  mL IVPB  250 mg BID CHIKIS GriffithPYahirR.N.   Stopped at 12/26/23 0553    calcium carbonate (Tums) chewable tab 500 mg  500 mg TID PRN Katelyn Garrett D.N.P.        acetaminophen (Tylenol) tablet 650 mg  650 mg Q6HRS PRN Jer Fung M.D.        ondansetron (Zofran) syringe/vial injection 4 mg  4 mg Q4HRS PRN Jer Fung M.D.   4 mg at 12/24/23 2254    ondansetron (Zofran ODT) dispertab 4 mg  4 mg Q4HRS PRN Jer Fung M.D.        amLODIPine (Norvasc) tablet 5 mg  5 mg BID Jer Fung M.D.   5 mg at 12/26/23 0448    levETIRAcetam (Keppra) tablet 1,000 mg  1,000 mg BID Jer Fung M.D.   1,000 mg at 12/26/23 0448    metoprolol SR (Toprol XL) tablet 25 mg  25 mg DAILY Jer Fung M.D.   25 mg at 12/26/23 0448    QUEtiapine (SEROquel) tablet 25 mg  25 mg BID Jer Fung M.D.   25 mg at 12/26/23 0448    sertraline (Zoloft) tablet 100 mg  100 mg DAILY Jer Fung M.D.   100 mg at 12/26/23 0448    terazosin (Hytrin) capsule 4 mg  4 mg Nightly Jer Fung M.D.   4 mg at 12/25/23 2000    pantoprazole (Protonix) injection 40 mg  40 mg BID Jer Fung M.D.   40 mg at 12/26/23 0448   Last reviewed on 12/24/2023  8:03 AM by Nikki Obrien R.N.       Review of Systems:  Review of Systems   Constitutional:  Negative for chills, fever and malaise/fatigue.   HENT:  Negative for congestion and sore throat.    Respiratory:  Negative for cough and shortness of breath.    Cardiovascular:  Negative for chest pain and leg swelling.   Gastrointestinal:  Negative for abdominal pain, blood in stool, constipation, diarrhea, heartburn, melena, nausea and vomiting.   Genitourinary:  Negative for dysuria and flank pain.   Musculoskeletal:  Negative for back pain and myalgias.   Neurological:  Positive for weakness. Negative for dizziness.   Psychiatric/Behavioral:  Positive for memory loss. Negative for depression.    All other systems reviewed and are  "negative.        Vital signs:  Weight/BMI: Body mass index is 23 kg/m².  /57   Pulse 75   Temp 36.7 °C (98.1 °F) (Temporal)   Resp 16   Ht 1.803 m (5' 11\")   Wt 74.8 kg (164 lb 14.5 oz)   SpO2 96%   Vitals:    12/25/23 1552 12/25/23 1930 12/26/23 0429 12/26/23 0726   BP: 111/44 100/48 121/46 107/57   Pulse: 78 88 79 75   Resp: 16 17 17 16   Temp: 36 °C (96.8 °F) 36.4 °C (97.6 °F) 36.4 °C (97.6 °F) 36.7 °C (98.1 °F)   TempSrc: Temporal Temporal Temporal Temporal   SpO2: 97% 100% 95% 96%   Weight:       Height:         Oxygen Therapy:  Pulse Oximetry: 96 %, O2 (LPM): 0, O2 Delivery Device: None - Room Air    Intake/Output Summary (Last 24 hours) at 12/26/2023 1130  Last data filed at 12/25/2023 2100  Gross per 24 hour   Intake 320 ml   Output --   Net 320 ml           Physical Exam:  Physical Exam  Vitals and nursing note reviewed.   Constitutional:       General: He is awake. He is not in acute distress.     Appearance: Normal appearance. He is well-groomed. He is not ill-appearing.      Comments: Frail elderly male   HENT:      Head: Normocephalic and atraumatic.      Right Ear: External ear normal.      Left Ear: External ear normal.      Nose: Nose normal.      Mouth/Throat:      Mouth: Mucous membranes are moist.      Pharynx: Oropharynx is clear.   Eyes:      General: No scleral icterus.     Extraocular Movements: Extraocular movements intact.      Conjunctiva/sclera: Conjunctivae normal.   Cardiovascular:      Rate and Rhythm: Normal rate and regular rhythm.      Pulses: Normal pulses.      Heart sounds: Normal heart sounds.   Pulmonary:      Effort: Pulmonary effort is normal.      Breath sounds: Normal breath sounds.   Abdominal:      General: Abdomen is flat. Bowel sounds are normal. There is no distension.      Palpations: Abdomen is soft.      Tenderness: There is no abdominal tenderness. There is no guarding.   Musculoskeletal:         General: Normal range of motion.      Cervical back: " Normal range of motion and neck supple.   Skin:     General: Skin is warm and dry.      Capillary Refill: Capillary refill takes less than 2 seconds.      Coloration: Skin is pale.   Neurological:      Mental Status: He is alert. Mental status is at baseline.   Psychiatric:         Mood and Affect: Mood normal.         Behavior: Behavior normal. Behavior is cooperative.             Labs:  Recent Labs     12/24/23 0423 12/25/23  0124 12/26/23  0706   SODIUM 138 136 137   POTASSIUM 3.6 3.9 4.0   CHLORIDE 105 105 105   CO2 22 19* 22   BUN 16 19 16   CREATININE 0.82 0.77 0.86   CALCIUM 8.1* 8.2* 8.2*       Recent Labs     12/24/23  0423 12/25/23  0124 12/26/23  0706   GLUCOSE 96 109* 98       Recent Labs     12/24/23 0423 12/25/23  0124 12/26/23  0706   WBC 6.9 7.0 6.4       Recent Labs     12/24/23  0423 12/24/23  1217 12/25/23  0124 12/25/23  1647 12/26/23  0706   RBC 2.73*  --  2.61*  --  2.71*   HEMOGLOBIN 7.7*   < > 7.4* 7.7* 7.6*   HEMATOCRIT 24.2*  --  23.3* 23.4* 24.2*   PLATELETCT 211  --  200  --  217    < > = values in this interval not displayed.       Recent Results (from the past 24 hour(s))   HEMOGLOBIN AND HEMATOCRIT    Collection Time: 12/25/23  4:47 PM   Result Value Ref Range    Hemoglobin 7.7 (L) 14.0 - 18.0 g/dL    Hematocrit 23.4 (L) 42.0 - 52.0 %   CBC WITHOUT DIFFERENTIAL    Collection Time: 12/26/23  7:06 AM   Result Value Ref Range    WBC 6.4 4.8 - 10.8 K/uL    RBC 2.71 (L) 4.70 - 6.10 M/uL    Hemoglobin 7.6 (L) 14.0 - 18.0 g/dL    Hematocrit 24.2 (L) 42.0 - 52.0 %    MCV 89.3 81.4 - 97.8 fL    MCH 28.0 27.0 - 33.0 pg    MCHC 31.4 (L) 32.3 - 36.5 g/dL    RDW 47.8 35.9 - 50.0 fL    Platelet Count 217 164 - 446 K/uL    MPV 9.4 9.0 - 12.9 fL   Basic Metabolic Panel    Collection Time: 12/26/23  7:06 AM   Result Value Ref Range    Sodium 137 135 - 145 mmol/L    Potassium 4.0 3.6 - 5.5 mmol/L    Chloride 105 96 - 112 mmol/L    Co2 22 20 - 33 mmol/L    Glucose 98 65 - 99 mg/dL    Bun 16 8 - 22  mg/dL    Creatinine 0.86 0.50 - 1.40 mg/dL    Calcium 8.2 (L) 8.5 - 10.5 mg/dL    Anion Gap 10.0 7.0 - 16.0   ESTIMATED GFR    Collection Time: 12/26/23  7:06 AM   Result Value Ref Range    GFR (CKD-EPI) 85 >60 mL/min/1.73 m 2       Radiology Review:  No orders to display         MDM (Data Review):   -Records reviewed and summarized in current documentation  -I personally reviewed and interpreted the laboratory results  -I personally reviewed the radiology images    Assessment/Recommendations:  Assessment:  Melena  Acute blood loss anemia  Atrial fibrillation on Eliquis  CAD on Plavix  NSTEMI 1 year ago    RECOMMENDATIONS:  ---PPI twice daily  ---Recommended relook upper endoscopy in 4 to 6 weeks.  Needs to be off antiplatelet x 5 days and DOAC x 48 hours prior to considering biopsies.  ---[facility]  --- Continue Eliquis on discharge      Cleared for discharge from GI standpoint.    Discussed with patient, wife at bedside, Dr. Anguiano.    Core Quality Measures   Reviewed items::  Labs, Medications and Radiology reports reviewed

## 2023-12-26 NOTE — DISCHARGE PLANNING
PC to wife who said that pt lives at [facility]. He requires total care. Explained that pt was accepted by [facility]. Wife would like for pt to go to [facility] SNF.    Care Transition Team Assessment    Information Source  Orientation Level: Unable to assess  Information Given By: Spouse  Informant's Name: Lizzy  Who is responsible for making decisions for patient? : Spouse    Readmission Evaluation  Is this a readmission?: No    Elopement Risk  Legal Hold: No  Ambulatory or Self Mobile in Wheelchair: No-Not an Elopement Risk  Elopement Risk: Not at Risk for Elopement    Interdisciplinary Discharge Planning  Does Admitting Nurse Feel This Could be a Complex Discharge?: No  Primary Care Physician: NP Leisa Arzate  Lives with - Patient's Self Care Capacity: Attendant / Paid Care Giver, Spouse  Patient or legal guardian wants to designate a caregiver: No  Support Systems: None  Housing / Facility: Assisted Living Residence  Do You Take your Prescribed Medications Regularly: No  Reasons Why Not Taking Medications : Memory Issues  Able to Return to Previous ADL's: Future Time w/Therapy  Mobility Issues: Yes  Prior Services: Continuous (24 Hour) Care Giving Per Service  Patient Prefers to be Discharged to:: Home  Assistance Needed: Yes  Durable Medical Equipment: Other - Specify (wheelchair)    Discharge Preparedness  What is your plan after discharge?: Skilled nursing facility  What are your discharge supports?: Spouse  Prior Functional Level: Ambulatory, Drives Self, Independent with Activities of Daily Living, Independent with Medication Management  Difficulity with ADLs: Walking, Toileting, Bathing, Brushing teeth, Dressing  Difficulity with IADLs: Cooking, Driving, Keeping track of finances, Laundry, Managing medication, Shopping, Using the telephone or computer    Functional Assesment  Prior Functional Level: Ambulatory, Drives Self, Independent with Activities of Daily Living, Independent with Medication  Management    Finances  Financial Barriers to Discharge: No  Prescription Coverage: Yes    Vision / Hearing Impairment  Vision Impairment : Yes  Hearing Impairment : Yes    Values / Beliefs / Concerns  Values / Beliefs Concerns : No    Advance Directive  Advance Directive?: None    Domestic Abuse  Have you ever been the victim of abuse or violence?: No         Discharge Risks or Barriers  Discharge risks or barriers?: Post-acute placement / services  Patient risk factors: Cognitive / sensory / physical deficit, Complex medical needs    Anticipated Discharge Information  Discharge Disposition: D/T to SNF with Medicare cert in anticipation of skilled care (03)

## 2024-01-02 NOTE — DOCUMENTATION QUERY
"                                                                         [facility]                                                                       Query Response Note      PATIENT:               IRVING PORTER  ACCT #:                  [id number]  MRN:                     [id number]  :                      1940  ADMIT DATE:       2023 4:38 PM  DISCH DATE:        2023 2:12 PM  RESPONDING  PROVIDER #:        [id number]           QUERY TEXT:    Please clarify the relationship, if any, between anticoagulant/antiplatelets and Hall's esophagus, gastritis.    The patient's Clinical Indicators include:  84yo with dx melena, acute blood loss anemia, Hall's esophagus, duodenitis     ED note \"Patient is on Plavix and Apixaban for post ACS and has been on this for 1.5 years. \"    Risk factors: advanced age, longterm use of anticoagulant/antiplatelet  Treatments: labwork, holding medication, specialty consultation, EGD, imaging    Contact me with questions.    Thank you,  Madison Carey, FNP, CDI  [email or web address]  Options provided:   -- Hall's esophagus, gastritis is due to/associated with use of anticoagulant/antiplatelets   -- Hall's esophagus, gastritis is not due to/associated with use of anticoagulant/antiplatelets   -- Other explanation, (please specify other explanation)   -- Unable to determine      Query created by: Aurelio April on 2023 6:40 AM    RESPONSE TEXT:    Hall's esophagus, gastritis is not due to/associated with use of anticoagulant/antiplatelets       QUERY TEXT:    Please clarify the relationship, if any, between anticoagulant/antiplatelets and Hall's esophagus, gastritis.    The patient's Clinical Indicators include:  84yo with dx melena, acute blood loss anemia, Hall's esophagus, duodenitis, atrial fibrillation     ED note \"Patient is on Plavix and Apixaban for post ACS and has been on this for 1.5 years. \"   H&P \"AF " "Controlled\"    Risk factors: advanced age, longterm use of anticoagulant/antiplatelet  Treatments: labwork, medication, monitoring    Contact me with questions.    Thank you,  Madison Carey, KADIP, CDI  [email or web address]  Options provided:   -- Hall's esophagus, gastritis is due to/associated with use of anticoagulant/antiplatelets   -- Hall's esophagus, gastritis is not due to/associated with use of anticoagulant/antiplatelets   -- Other explanation, (please specify other explanation)   -- Unable to determine      Query created by: Madison Carey on 12/26/2023 6:44 AM    RESPONSE TEXT:    Unable to determine          Electronically signed by:  GURU NGO MD 1/2/2024 8:02 AM              "

## 2024-03-06 NOTE — ED TRIAGE NOTES
"Chief Complaint   Patient presents with    Bloody Stools     BIB EMS from Assisted living facility. Per staff pt experience one bout of dark stool two days ago. Today the doctor requested pt be brought to ED for follow up evaluation. Pt currently denies any complaints.         BP (!) 141/65   Pulse 68   Temp 36.5 °C (97.7 °F) (Temporal)   Resp 15   Ht 1.803 m (5' 11\")   Wt 74.8 kg (164 lb 14.5 oz)   SpO2 98%   BMI 23.00 kg/m²     Pt takes Plavix and Eliquis   Baseline a/o 1-2  " Abdomen soft, non-tender, no guarding.

## (undated) DEVICE — ELECTRODE DUAL RETURN W/ CORD - (50/PK)

## (undated) DEVICE — PORT AUXILLARY WATER (50EA/BX)

## (undated) DEVICE — MASK OXYGEN VNYL ADLT MED CONC WITH 7 FOOT TUBING  - (50EA/CA)

## (undated) DEVICE — SENSOR OXIMETER ADULT SPO2 RD SET (20EA/BX)

## (undated) DEVICE — CONTAINER, SPECIMEN, STERILE

## (undated) DEVICE — FORCEP RADIAL JAW 4 STANDARD CAPACITY W/NEEDLE 240CM (40EA/BX)

## (undated) DEVICE — FILM CASSETTE ENDO

## (undated) DEVICE — CANISTER SUCTION RIGID RED 1500CC (40EA/CA)

## (undated) DEVICE — SOD. CHL. INJ. 0.9% 1000 ML - (14EA/CA 60CA/PF)

## (undated) DEVICE — SENSOR SPO2 ADULT LNCS ADTX (20/BX) ORDER ITEM #19593

## (undated) DEVICE — MASK PANORAMIC OXYGEN PRO2 (30EA/CA)

## (undated) DEVICE — WATER IRRIGATION STERILE 1000ML (12EA/CA)

## (undated) DEVICE — SODIUM CHL IRRIGATION 0.9% 1000ML (12EA/CA)

## (undated) DEVICE — KIT CUSTOM PROCEDURE SINGLE FOR ENDO  (15/CA)

## (undated) DEVICE — ELECTRODE 850 FOAM ADHESIVE - HYDROGEL RADIOTRNSPRNT (50/PK)

## (undated) DEVICE — BUTTON ENDOSCOPY DISPOSABLE

## (undated) DEVICE — SET LEADWIRE 5 LEAD BEDSIDE DISPOSABLE ECG (1SET OF 5/EA)

## (undated) DEVICE — NEPTUNE 4 PORT MANIFOLD - (20/PK)

## (undated) DEVICE — TUBE CONNECTING SUCTION - CLEAR PLASTIC STERILE 72 IN (50EA/CA)